# Patient Record
Sex: MALE | Race: WHITE | NOT HISPANIC OR LATINO | Employment: OTHER | URBAN - METROPOLITAN AREA
[De-identification: names, ages, dates, MRNs, and addresses within clinical notes are randomized per-mention and may not be internally consistent; named-entity substitution may affect disease eponyms.]

---

## 2022-02-17 ENCOUNTER — OFFICE VISIT (OUTPATIENT)
Dept: INTERNAL MEDICINE CLINIC | Facility: CLINIC | Age: 57
End: 2022-02-17
Payer: COMMERCIAL

## 2022-02-17 VITALS
TEMPERATURE: 98.9 F | DIASTOLIC BLOOD PRESSURE: 82 MMHG | WEIGHT: 157 LBS | OXYGEN SATURATION: 99 % | HEART RATE: 71 BPM | SYSTOLIC BLOOD PRESSURE: 130 MMHG | BODY MASS INDEX: 25.23 KG/M2 | HEIGHT: 66 IN

## 2022-02-17 DIAGNOSIS — Z12.5 SCREENING FOR MALIGNANT NEOPLASM OF PROSTATE: ICD-10-CM

## 2022-02-17 DIAGNOSIS — R33.9 URINARY RETENTION: ICD-10-CM

## 2022-02-17 DIAGNOSIS — Z13.6 SCREENING FOR CARDIOVASCULAR CONDITION: ICD-10-CM

## 2022-02-17 DIAGNOSIS — Z12.11 SCREENING FOR COLORECTAL CANCER: ICD-10-CM

## 2022-02-17 DIAGNOSIS — M54.16 LUMBAR RADICULOPATHY: ICD-10-CM

## 2022-02-17 DIAGNOSIS — M54.12 CERVICAL RADICULOPATHY: Primary | ICD-10-CM

## 2022-02-17 DIAGNOSIS — E55.9 VITAMIN D DEFICIENCY: ICD-10-CM

## 2022-02-17 DIAGNOSIS — E66.3 OVERWEIGHT: ICD-10-CM

## 2022-02-17 DIAGNOSIS — Z11.59 NEED FOR HEPATITIS C SCREENING TEST: ICD-10-CM

## 2022-02-17 DIAGNOSIS — Z12.12 SCREENING FOR COLORECTAL CANCER: ICD-10-CM

## 2022-02-17 DIAGNOSIS — Z11.4 SCREENING FOR HIV (HUMAN IMMUNODEFICIENCY VIRUS): ICD-10-CM

## 2022-02-17 PROCEDURE — 99204 OFFICE O/P NEW MOD 45 MIN: CPT | Performed by: INTERNAL MEDICINE

## 2022-02-17 PROCEDURE — 1036F TOBACCO NON-USER: CPT | Performed by: INTERNAL MEDICINE

## 2022-02-17 PROCEDURE — 3008F BODY MASS INDEX DOCD: CPT | Performed by: INTERNAL MEDICINE

## 2022-02-17 PROCEDURE — 3725F SCREEN DEPRESSION PERFORMED: CPT | Performed by: INTERNAL MEDICINE

## 2022-02-17 NOTE — PROGRESS NOTES
INTERNAL MEDICINE OFFICE VISIT  [facility] AT [address]  Tel: [phone]      NAME: Ana Sotelo  AGE: 49 y o  SEX: male  : 1965   MRN: [id number]    DATE: 2022  TIME: 10:32 AM      Assessment and Plan:  1  Cervical radiculopathy   was advised to follow-up with pain management  - Ambulatory Referral to Pain Management; Future    2  Lumbar radiculopathy   does not want to take any medication  Was told to follow-up with pain management  - Ambulatory Referral to Pain Management; Future    3  Urinary retention   was told to keep himself well hydrated and see the urologist  - CBC and differential; Future  - Comprehensive metabolic panel; Future  - Ambulatory Referral to Urology; Future    4  Vitamin D deficiency   check a vitamin-D level  - Vitamin D 25 hydroxy; Future    5  Overweight  BMI Counseling: Body mass index is 25 34 kg/m²  The BMI is above normal  Nutrition recommendations include encouraging healthy choices of fruits and vegetables and moderation in carbohydrate intake  Exercise recommendations include moderate physical activity 150 minutes/week  Rationale for BMI follow-up plan is due to patient being overweight or obese  Depression Screening and Follow-up Plan: Patient was screened for depression during today's encounter  They screened negative with a PHQ-2 score of 2         6  Screening for HIV (human immunodeficiency virus)    - HIV 1/2 Antigen/Antibody (4th Generation) w Reflex [facility]; Future    7  Need for hepatitis C screening test    - Hepatitis C Antibody ([facility]); Future    8  Screening for colorectal cancer  - Ambulatory referral to Gastroenterology; Future    9  Screening for cardiovascular condition  - Lipid panel; Future    10  Screening for malignant neoplasm of prostate    - PSA, total and free;  Future      - Counseling Documentation: patient was counseled regarding: diagnostic results, instructions for management, risk factor reductions, prognosis, patient and family education, risks and benefits of treatment options and importance of compliance with treatment  - Medication Side Effects: Adverse side effects of medications were reviewed with the patient/guardian today  Return for follow up visit in  6 months or earlier, if needed  Chief Complaint:  Chief Complaint   Patient presents with    low back pain         History of Present Illness:    this is a new patient was here to establish  He has not seen a doctor for about 10 years  He has been suffering from neck and back issues for a long time  He was worked up for it about 10 years ago but after that he did not go back to the doctors  Now his symptoms are getting worse and he has numbness and pain in his right hand and right leg  He complains of not going to the bathroom for hours to urinate  He does not seem to have any symptoms of a UTI   He needs blood work done and the screening tests      Active Problem List:  Patient Active Problem List   Diagnosis    Vitamin D deficiency    Overweight    Cervical radiculopathy    Lumbar radiculopathy    Urinary retention         Past Medical History:  Past Medical History:   Diagnosis Date    Depression with anxiety 1/30/2015    Fatigue 1/30/2015         Past Surgical History:  History reviewed  No pertinent surgical history  Family History:  History reviewed  No pertinent family history        Social History:  Social History     Socioeconomic History    Marital status: Unknown     Spouse name: None    Number of children: None    Years of education: None    Highest education level: None   Occupational History    None   Tobacco Use    Smoking status: Never Smoker    Smokeless tobacco: Never Used   Vaping Use    Vaping Use: Never used   Substance and Sexual Activity    Alcohol use: None    Drug use: None    Sexual activity: None   Other Topics Concern    None   Social History Narrative    None     Social Determinants of Health     Financial Resource Strain: Not on file   Food Insecurity: Not on file   Transportation Needs: Not on file   Physical Activity: Not on file   Stress: Not on file   Social Connections: Not on file   Intimate Partner Violence: Not on file   Housing Stability: Not on file         Allergies:  No Known Allergies      Medications:  No current outpatient medications on file        The following portions of the patient's history were reviewed and updated as appropriate: past medical history, past surgical history, family history, social history, allergies, current medications and active problem list       Review of Systems:  Constitutional: Denies fever, chills, weight gain, weight loss, fatigue  Eyes: Denies eye redness, eye discharge, double vision, change in visual acuity  ENT: Denies hearing loss, tinnitus, sneezing, nasal congestion, nasal discharge, sore throat   Respiratory: Denies cough, expectoration, hemoptysis, shortness of breath, wheezing  Cardiovascular: Denies chest pain, palpitations, lower extremity swelling, orthopnea, PND  Gastrointestinal: Denies abdominal pain, heartburn, nausea, vomiting, hematemesis, diarrhea, bloody stools  Genito-Urinary: Denies dysuria, frequency, difficulty in micturition, nocturia, incontinence  Musculoskeletal:  Complains of neck pain, right hand and right leg pain, back pain, joint pain, muscle pain  Neurologic: Denies confusion, lightheadedness, syncope, headache, focal weakness, sensory changes, seizures  Endocrine: Denies polyuria, polydipsia, temperature intolerance  Allergy and Immunology: Denies hives, insect bite sensitivity  Hematological and Lymphatic: Denies bleeding problems, swollen glands   Psychological: Denies depression, suicidal ideation, anxiety, panic, mood swings  Dermatological: Denies pruritus, rash, skin lesion changes      Vitals:  Vitals:    02/17/22 1003   BP: 130/82   Pulse: 71   Temp: 98 9 °F (37 2 °C)   SpO2: 99%       Body mass index is 25 34 kg/m²  Weight (last 2 days)     Date/Time Weight    02/17/22 1003 71 2 (157)            Physical Examination:  General: Patient is not in acute distress  Awake, alert, responding to commands  No weight gain or loss  Head: Normocephalic  Atraumatic  Eyes: Conjunctiva and lids with no swelling, erythema or discharge  Both pupils normal sized, round and reactive to light  Sclera nonicteric  ENT: External examination of nose and ear normal  Otoscopic examination shows translucent tympanic membranes with patent canals without erythema  Oropharynx moist with no erythema, edema, exudate or lesions  Neck: Supple  JVP not raised  Trachea midline  No masses  No thyromegaly  Lungs: No signs of increased work of breathing or respiratory distress  Bilateral bronchovascular breath sounds with no crackles or rhonchi  Chest wall: No tenderness  Cardiovascular: Normal PMI  No thrills  Regular rate and rhythm  S1 and S2 normal  No murmur, rub or gallop  Gastrointestinal: Abdomen soft, nontender  No guarding or rigidity  Liver and spleen not palpable  Bowel sounds present  Neurologic: Cranial nerves II-XII intact   Cortical functions normal  Motor system - Reflexes 2+ and symmetrical  Sensations normal  Musculoskeletal: Gait normal  No joint tenderness  Integumentary: Skin normal with no rash or lesions  Lymphatic: No palpable lymph nodes in neck, axilla or groin  Extremities: No clubbing, cyanosis, edema or varicosities  Psychological: Judgement and insight normal  Mood and affect normal      Laboratory Results:  CBC with diff:   Lab Results   Component Value Date    WBC 7 6 01/30/2015    RBC 4 54 01/30/2015    HGB 15 1 01/30/2015    HCT 44 4 01/30/2015    MCV 98 01/30/2015    MCH 33 2 01/30/2015    RDW 11 5 01/30/2015     01/30/2015       CMP:  Lab Results   Component Value Date    CREATININE 0 9 01/30/2015    BUN 12 01/30/2015     01/30/2015    K 4 1 01/30/2015     01/30/2015    CO2 30 8 01/30/2015    GLUCOSE 145 (H) 01/30/2015    PROT 7 1 01/30/2015    ALKPHOS 68 01/30/2015    ALT 23 01/30/2015    AST 18 01/30/2015       No results found for: HGBA1C, MG, PHOS    No results found for: TROPONINI, CKMB, CKTOTAL    Lipid Profile:   No results found for: CHOL  No results found for: HDL  No results found for: LDLCALC  No results found for: TRIG    Imaging Results:  No image results found         Health Maintenance:  Health Maintenance   Topic Date Due    Hepatitis C Screening  Never done    HIV Screening  Never done    BMI: Followup Plan  Never done    Annual Physical  Never done    DTaP,Tdap,and Td Vaccines (1 - Tdap) Never done    Colorectal Cancer Screening  Never done    Influenza Vaccine (1) Never done    COVID-19 Vaccine (3 - Booster for Moderna series) 10/27/2021    BMI: Adult  02/17/2023    Pneumococcal Vaccine: Pediatrics (0 to 5 Years) and At-Risk Patients (6 to 59 Years)  Aged Out    HIB Vaccine  Aged Out    Hepatitis B Vaccine  Aged Out    IPV Vaccine  Aged Out    Hepatitis A Vaccine  Aged Out    Meningococcal ACWY Vaccine  Aged Out    HPV Vaccine  Aged [employer] History   Administered Date(s) Administered    COVID-19 MODERNA VACC 0 5 ML IM 04/29/2021, 05/27/2021         Levy Greer MD  2/17/2022,10:32 AM

## 2022-02-22 ENCOUNTER — APPOINTMENT (OUTPATIENT)
Dept: LAB | Facility: CLINIC | Age: 57
End: 2022-02-22
Payer: COMMERCIAL

## 2022-02-22 ENCOUNTER — TELEPHONE (OUTPATIENT)
Dept: INTERNAL MEDICINE CLINIC | Facility: CLINIC | Age: 57
End: 2022-02-22

## 2022-02-22 ENCOUNTER — TELEPHONE (OUTPATIENT)
Dept: GASTROENTEROLOGY | Facility: CLINIC | Age: 57
End: 2022-02-22

## 2022-02-22 DIAGNOSIS — E55.9 VITAMIN D DEFICIENCY: Primary | ICD-10-CM

## 2022-02-22 DIAGNOSIS — Z12.5 SCREENING FOR MALIGNANT NEOPLASM OF PROSTATE: ICD-10-CM

## 2022-02-22 DIAGNOSIS — Z13.6 SCREENING FOR CARDIOVASCULAR CONDITION: ICD-10-CM

## 2022-02-22 DIAGNOSIS — E55.9 VITAMIN D DEFICIENCY: ICD-10-CM

## 2022-02-22 DIAGNOSIS — R33.9 URINARY RETENTION: ICD-10-CM

## 2022-02-22 DIAGNOSIS — Z11.59 NEED FOR HEPATITIS C SCREENING TEST: ICD-10-CM

## 2022-02-22 DIAGNOSIS — Z11.4 SCREENING FOR HIV (HUMAN IMMUNODEFICIENCY VIRUS): ICD-10-CM

## 2022-02-22 LAB
25(OH)D3 SERPL-MCNC: 12.4 NG/ML (ref 30–100)
ALBUMIN SERPL BCP-MCNC: 4 G/DL (ref 3.5–5)
ALP SERPL-CCNC: 55 U/L (ref 46–116)
ALT SERPL W P-5'-P-CCNC: 27 U/L (ref 12–78)
ANION GAP SERPL CALCULATED.3IONS-SCNC: 3 MMOL/L (ref 4–13)
AST SERPL W P-5'-P-CCNC: 26 U/L (ref 5–45)
BASOPHILS # BLD AUTO: 0.05 THOUSANDS/ΜL (ref 0–0.1)
BASOPHILS NFR BLD AUTO: 1 % (ref 0–1)
BILIRUB SERPL-MCNC: 0.3 MG/DL (ref 0.2–1)
BUN SERPL-MCNC: 19 MG/DL (ref 5–25)
CALCIUM SERPL-MCNC: 9.3 MG/DL (ref 8.3–10.1)
CHLORIDE SERPL-SCNC: 110 MMOL/L (ref 100–108)
CHOLEST SERPL-MCNC: 171 MG/DL
CO2 SERPL-SCNC: 27 MMOL/L (ref 21–32)
CREAT SERPL-MCNC: 1.07 MG/DL (ref 0.6–1.3)
EOSINOPHIL # BLD AUTO: 0.37 THOUSAND/ΜL (ref 0–0.61)
EOSINOPHIL NFR BLD AUTO: 8 % (ref 0–6)
ERYTHROCYTE [DISTWIDTH] IN BLOOD BY AUTOMATED COUNT: 12.2 % (ref 11.6–15.1)
GFR SERPL CREATININE-BSD FRML MDRD: 78 ML/MIN/1.73SQ M
GLUCOSE P FAST SERPL-MCNC: 103 MG/DL (ref 65–99)
HCT VFR BLD AUTO: 42 % (ref 36.5–49.3)
HCV AB SER QL: NORMAL
HDLC SERPL-MCNC: 55 MG/DL
HGB BLD-MCNC: 14.3 G/DL (ref 12–17)
IMM GRANULOCYTES # BLD AUTO: 0.01 THOUSAND/UL (ref 0–0.2)
IMM GRANULOCYTES NFR BLD AUTO: 0 % (ref 0–2)
LDLC SERPL CALC-MCNC: 102 MG/DL (ref 0–100)
LYMPHOCYTES # BLD AUTO: 0.76 THOUSANDS/ΜL (ref 0.6–4.47)
LYMPHOCYTES NFR BLD AUTO: 16 % (ref 14–44)
MCH RBC QN AUTO: 32.4 PG (ref 26.8–34.3)
MCHC RBC AUTO-ENTMCNC: 34 G/DL (ref 31.4–37.4)
MCV RBC AUTO: 95 FL (ref 82–98)
MONOCYTES # BLD AUTO: 0.46 THOUSAND/ΜL (ref 0.17–1.22)
MONOCYTES NFR BLD AUTO: 10 % (ref 4–12)
NEUTROPHILS # BLD AUTO: 3.02 THOUSANDS/ΜL (ref 1.85–7.62)
NEUTS SEG NFR BLD AUTO: 65 % (ref 43–75)
NONHDLC SERPL-MCNC: 116 MG/DL
NRBC BLD AUTO-RTO: 0 /100 WBCS
PLATELET # BLD AUTO: 199 THOUSANDS/UL (ref 149–390)
PMV BLD AUTO: 11.3 FL (ref 8.9–12.7)
POTASSIUM SERPL-SCNC: 4.4 MMOL/L (ref 3.5–5.3)
PROT SERPL-MCNC: 7.1 G/DL (ref 6.4–8.2)
RBC # BLD AUTO: 4.41 MILLION/UL (ref 3.88–5.62)
SODIUM SERPL-SCNC: 140 MMOL/L (ref 136–145)
TRIGL SERPL-MCNC: 70 MG/DL
WBC # BLD AUTO: 4.67 THOUSAND/UL (ref 4.31–10.16)

## 2022-02-22 PROCEDURE — 82306 VITAMIN D 25 HYDROXY: CPT

## 2022-02-22 PROCEDURE — 84153 ASSAY OF PSA TOTAL: CPT

## 2022-02-22 PROCEDURE — 80061 LIPID PANEL: CPT

## 2022-02-22 PROCEDURE — 84154 ASSAY OF PSA FREE: CPT

## 2022-02-22 PROCEDURE — 36415 COLL VENOUS BLD VENIPUNCTURE: CPT

## 2022-02-22 PROCEDURE — 87389 HIV-1 AG W/HIV-1&-2 AB AG IA: CPT

## 2022-02-22 PROCEDURE — 86803 HEPATITIS C AB TEST: CPT

## 2022-02-22 PROCEDURE — 85025 COMPLETE CBC W/AUTO DIFF WBC: CPT

## 2022-02-22 PROCEDURE — 80053 COMPREHEN METABOLIC PANEL: CPT

## 2022-02-22 RX ORDER — ERGOCALCIFEROL 1.25 MG/1
50000 CAPSULE ORAL WEEKLY
Qty: 12 CAPSULE | Refills: 3 | Status: SHIPPED | OUTPATIENT
Start: 2022-02-22

## 2022-02-22 NOTE — TELEPHONE ENCOUNTER
Called the patient and notified him   Patient wants to know if there is alternative option instead of taking the supplement to bring up his vit D levels, please advise

## 2022-02-22 NOTE — TELEPHONE ENCOUNTER
----- Message from Nury Guillory MD sent at 2/22/2022  4:39 PM EST -----   Vitamin-D is very low, please  the vitamin-D from the pharmacy and take it every week

## 2022-02-23 LAB
HIV 1+2 AB+HIV1 P24 AG SERPL QL IA: NORMAL
PSA FREE MFR SERPL: 50 %
PSA FREE SERPL-MCNC: 0.1 NG/ML
PSA SERPL-MCNC: 0.2 NG/ML (ref 0–4)

## 2022-03-04 ENCOUNTER — TELEPHONE (OUTPATIENT)
Dept: UROLOGY | Facility: AMBULATORY SURGERY CENTER | Age: 57
End: 2022-03-04

## 2022-03-04 NOTE — TELEPHONE ENCOUNTER
Complaint/Diagnosis:Retention         Insurance:[employer]    History of cancer:no    Previous urologist:no    OutsideTesting/where:[facility]    If yes,what kind:ep[facility]    Records requested/where:epic    Preferred location:[place]

## 2022-03-08 NOTE — TELEPHONE ENCOUNTER
Attempted to contact patient  No answer  Left voice message to contact our practice to schedule an appt

## 2022-03-10 NOTE — PROGRESS NOTES
Assessment:  1  Chronic pain syndrome    2  Neck pain    3  Cervical radiculopathy    4  Chronic right-sided low back pain with right-sided sciatica    5  Lumbar radiculopathy        Plan:  Orders Placed This Encounter   Procedures    MRI cervical spine without contrast     Standing Status:   Future     Standing Expiration Date:   3/15/2026     Scheduling Instructions: There is no preparation for this test  Please leave your jewelry and valuables at home, wedding rings are the exception  Magnetic nail polish must be removed prior to arrival for your test  Please bring your insurance cards, a form of photo ID and a list of your medications with you  Arrive 15 minutes prior to your appointment time in order to register  Please bring any prior CT or MRI studies of this area that were not performed at a [facility]  To schedule this appointment, please contact Central Scheduling at [phone]  Prior to your appointment, please make sure you complete the MRI Screening Form when you e-Check in for your appointment  This will be available starting 7 days before your appointment in [address]  You may receive an e-mail with an activation code if you do not have a [employer] account  If you do not have access to a device, we will complete your screening at your appointment  Order Specific Question:   What is the patient's sedation requirement? Answer:   Unknown     Order Specific Question:   Release to patient through [employer]     Answer:   Immediate     Order Specific Question:   Is order priority selected as STAT? Answer:   No     Order Specific Question:   Reason for Exam (FREE TEXT)     Answer:   cervical radiculopathy    MRI lumbar spine without contrast     Standing Status:   Future     Standing Expiration Date:   3/15/2026     Scheduling Instructions:       There is no preparation for this test  Please leave your jewelry and valuables at home, wedding rings are the exception  Magnetic nail polish must be removed prior to arrival for your test  Please bring your insurance cards, a form of photo ID and a list of your medications with you  Arrive 15 minutes prior to your appointment time in order to register  Please bring any prior CT or MRI studies of this area that were not performed at a [facility]  To schedule this appointment, please contact Central Scheduling at [phone]  Prior to your appointment, please make sure you complete the MRI Screening Form when you e-Check in for your appointment  This will be available starting 7 days before your appointment in [address]  You may receive an e-mail with an activation code if you do not have a [employer] account  If you do not have access to a device, we will complete your screening at your appointment  Order Specific Question:   What is the patient's sedation requirement? Answer:   Unknown     Order Specific Question:   Release to patient through [employer]     Answer:   Immediate     Order Specific Question:   Is order priority selected as STAT? Answer:   No     Order Specific Question:   Reason for Exam (FREE TEXT)     Answer:   lumbar radiculopathy     My impressions and treatment recommendations were discussed in detail with the patient, who verbalized understanding and had no further questions  The patient is reporting pain primarily in his neck with radiation into the right upper extremity as well as his low back with radiation into the right lower extremity  He states that his symptoms are not helped with physical therapy  He has done physical therapy in the past and has been doing the physical therapy directed exercises for the past several weeks  He reports that neither his neck pain or low back pain is helped with the physical therapy directed exercises    As such, I feel reasonable to have the patient undergo a MRI of the cervical spine as well as the MRI of the lumbar spine to evaluate for any pathology that may be contributing to his pain complaints  Follow-up is planned in 4 weeks time or sooner as warranted  Discharge instructions were provided  I personally saw and examined the patient and I agree with the above discussed plan of care  History of Present Illness:    Marycarmen Alcantar is a 49 y o  male who presents to [facility] for initial evaluation of the above stated pain complaints  The patient has a past medical and chronic pain history as outlined in the assessment section  He was referred by Dr Fab Agustin  The patient is reporting pain primarily in the neck with radiation into the right upper extremity as well as low back with radiation into the right lower extremity to about the level of the right knee in what appears to be the right S1 distribution  He describes his pain as moderate to severe and 7 to 9/10 on the verbal numerical pain rating scale  He states that his pain is nearly constant in nature without any typical pattern  He describes his pain as shooting, numbness, sharp, dull/aching  He reports weakness in his upper extremities  He does not ambulate with any assistive devices  Lying down, standing, bending, sitting, walking, exercise, coughing, and sneezing increases pain  There are no pain relieving factors  Physical therapy and home exercises along with heat/ice treatment provided moderate pain relief  The patient does drink alcohol 1-2 times per week  Review of Systems:    Review of Systems   Constitutional: Negative for fever and unexpected weight change  HENT: Negative for trouble swallowing  Eyes: Negative for visual disturbance  Respiratory: Negative for shortness of breath and wheezing  Cardiovascular: Negative for chest pain and palpitations  Gastrointestinal: Negative for constipation, diarrhea, nausea and vomiting     Endocrine: Negative for cold intolerance, heat intolerance and polydipsia  Genitourinary: Negative for difficulty urinating and frequency  Musculoskeletal: Positive for arthralgias  Negative for gait problem, joint swelling and myalgias  Skin: Negative for rash  Neurological: Positive for numbness and headaches  Negative for dizziness, seizures, syncope and weakness  Hematological: Does not bruise/bleed easily  Psychiatric/Behavioral: Negative for dysphoric mood  All other systems reviewed and are negative  Patient Active Problem List   Diagnosis    Vitamin D deficiency    Overweight    Cervical radiculopathy    Lumbar radiculopathy    Urinary retention       Past Medical History:   Diagnosis Date    Depression with anxiety 1/30/2015    Fatigue 1/30/2015       History reviewed  No pertinent surgical history  Family History   Problem Relation Age of Onset    No Known Problems Mother     Diabetes Father     Heart disease Father     Diabetes Sister     Heart disease Sister     Diabetes Brother     Heart disease Brother        Social History     Occupational History    Not on file   Tobacco Use    Smoking status: Never Smoker    Smokeless tobacco: Never Used   Vaping Use    Vaping Use: Never used   Substance and Sexual Activity    Alcohol use: Yes     Comment: occasionally    Drug use: Not Currently    Sexual activity: Not on file         Current Outpatient Medications:     ergocalciferol (VITAMIN D2) 50,000 units, Take 1 capsule (50,000 Units total) by mouth once a week, Disp: 12 capsule, Rfl: 3    No Known Allergies    Physical Exam:    /93   Pulse 60   Resp 18   Ht 5' 6" (1 676 m)   Wt 71 7 kg (158 lb)   BMI 25 50 kg/m²     Constitutional: normal, well developed, well nourished, alert, in no distress and non-toxic and no overt pain behavior    Eyes: anicteric  HEENT: grossly intact  Neck: supple, symmetric, trachea midline and no masses   Pulmonary:even and unlabored  Cardiovascular:No edema or pitting edema present  Skin:Normal without rashes or lesions and well hydrated  Psychiatric:Mood and affect appropriate  Neurologic:Cranial Nerves II-XII grossly intact  Musculoskeletal:normal     Cervical Spine Exam    Appearance:  Normal lordosis  Palpation/Tenderness:  no tenderness or spasm  Sensory:  no sensory deficits noted  Range of Motion:  Flexion: Moderately limited  with pain  Extension:  Moderately limited  with pain  Lateral Flexion - Left:  Moderately limited  with pain  Lateral Flexion - Right:  Moderately limited  with pain  Rotation - Left:  Moderately limited  with pain  Rotation - Right:  Moderately limited  with pain  Motor Strength:  Left Arm Flexion  5/5  Left Arm Extension  5/5  Right Arm Flexion  5/5  Right Arm Extension  5/5  Left Wrist Flexion  5/5  Left Wrist Extension  5/5  Left Finger Abduction  5/5  Right Finger Abduction  5/5  Left Pincer Grasp  5/5  Right Pincer Grasp  5/5  Left    5/5  Right   5/5  Reflexes:  Left Biceps:  2+   Right Biceps:  2+   Left Brachioradialis:  2+   Right Brachioradialis:  2+   Left Triceps:  2+   Right Triceps:  2+   Special Tests:  Left Spurlings:  negative  Right Spurlings  negative     Lumbar Spine Exam    Appearance:  Normal lordosis  Palpation/Tenderness:  no tenderness or spasm  Sensory:  no sensory deficits noted  Range of Motion:  Flexion:   Moderately limited  with pain  Extension:  Moderately limited  with pain  Lateral Flexion - Left:  Moderately limited  with pain  Lateral Flexion - Right:  Moderately limited  with pain  Rotation - Left:  Moderately limited  with pain  Rotation - Right:  Moderately limited  with pain  Motor Strength:  Left hip flexion:  5/5  Left hip extension:  5/5  Right hip flexion:  5/5  Right hip extension:  5/5  Left knee flexion:  5/5  Left knee extension:  5/5  Right knee flexion:  5/5  Right knee extension:  5/5  Left foot dorsiflexion:  5/5  Left foot plantar flexion:  5/5  Right foot dorsiflexion:  5/5  Right foot plantar flexion:  5/5  Reflexes:  Left Patellar:  2+   Right Patellar:  2+   Left Achilles:  2+   Right Achilles:  2+   Special Tests:  Left Straight Leg Test:  negative  Right Straight Leg Test:  negative  Left Chandler's Maneuver:  negative  Right Chandler's Maneuver:  negative        Imaging  MRI cervical spine without contrast    (Results Pending)   MRI lumbar spine without contrast    (Results Pending)       Orders Placed This Encounter   Procedures    MRI cervical spine without contrast    MRI lumbar spine without contrast

## 2022-03-10 NOTE — TELEPHONE ENCOUNTER
3rd attempt to contact patient   No answer left a message on voice mail for patient to contact our office to schedule an appt

## 2022-03-15 ENCOUNTER — CONSULT (OUTPATIENT)
Dept: PAIN MEDICINE | Facility: CLINIC | Age: 57
End: 2022-03-15
Payer: COMMERCIAL

## 2022-03-15 VITALS
HEIGHT: 66 IN | DIASTOLIC BLOOD PRESSURE: 93 MMHG | WEIGHT: 158 LBS | BODY MASS INDEX: 25.39 KG/M2 | SYSTOLIC BLOOD PRESSURE: 142 MMHG | RESPIRATION RATE: 18 BRPM | HEART RATE: 60 BPM

## 2022-03-15 DIAGNOSIS — M54.16 LUMBAR RADICULOPATHY: ICD-10-CM

## 2022-03-15 DIAGNOSIS — M54.2 NECK PAIN: ICD-10-CM

## 2022-03-15 DIAGNOSIS — M54.12 CERVICAL RADICULOPATHY: ICD-10-CM

## 2022-03-15 DIAGNOSIS — M54.41 CHRONIC RIGHT-SIDED LOW BACK PAIN WITH RIGHT-SIDED SCIATICA: ICD-10-CM

## 2022-03-15 DIAGNOSIS — G89.4 CHRONIC PAIN SYNDROME: Primary | ICD-10-CM

## 2022-03-15 DIAGNOSIS — G89.29 CHRONIC RIGHT-SIDED LOW BACK PAIN WITH RIGHT-SIDED SCIATICA: ICD-10-CM

## 2022-03-15 PROCEDURE — 99204 OFFICE O/P NEW MOD 45 MIN: CPT | Performed by: ANESTHESIOLOGY

## 2022-03-15 NOTE — PATIENT INSTRUCTIONS
Magnetic Resonance Imaging   WHAT YOU NEED TO KNOW:   What do I need to know about magnetic resonance imaging (MRI)? An MRI is a test that uses magnetic fields and radio waves to take pictures inside your body  An MRI is used to see blood vessels, tissue, muscles, and bones  It can also show organs, such as your heart, lungs, or liver  An MRI can help your healthcare provider diagnose or treat a medical condition  It does not use radiation  How do I prepare for an MRI? · Your healthcare provider will tell you which medicines to take or not take on the day of your MRI  He or she may give you medicine to help you feel calm and relaxed during the MRI  · Tell your provider if you know or think you are pregnant  · Tell your provider if you have any metal in your body, such as a pacemaker, implant, or aneurism clip  Tell him or her if you have a tattoo or wear a medicine patch  · Remove any metal items, such as hair clips, jewelry, glasses, hearing aids, or dentures before you enter the MRI room  What will happen during an MRI? Your healthcare provider will ask you to lie on a table  Contrast liquid may be used to help a body part show up more clearly  The table will be moved into an open space in the middle of the machine  You will need to lie still during the MRI  It is normal to hear knocking, thumping, or clicking noises from the machine  What are the risks of an MRI? An MRI may cause a metal object in your body to move out of place and cause serious injury, or stop working properly  The contrast liquid may cause nausea, a headache, lightheadedness, or pain at the injection site  You could have an allergic reaction to the contrast liquid  CARE AGREEMENT:   You have the right to help plan your care  Learn about your health condition and how it may be treated  Discuss treatment options with your healthcare providers to decide what care you want to receive   You always have the right to refuse treatment  The above information is an  only  It is not intended as medical advice for individual conditions or treatments  Talk to your doctor, nurse or pharmacist before following any medical regimen to see if it is safe and effective for you  © Copyright [employer] 2022 Information is for End User's use only and may not be sold, redistributed or otherwise used for commercial purposes   All illustrations and images included in CareNotes® are the copyrighted property of A [employer] M , [employer]  or [address] [place] [address]

## 2022-03-21 ENCOUNTER — ANESTHESIA EVENT (OUTPATIENT)
Dept: GASTROENTEROLOGY | Facility: HOSPITAL | Age: 57
End: 2022-03-21

## 2022-03-21 ENCOUNTER — HOSPITAL ENCOUNTER (OUTPATIENT)
Dept: GASTROENTEROLOGY | Facility: HOSPITAL | Age: 57
Setting detail: OUTPATIENT SURGERY
Discharge: HOME | End: 2022-03-21
Attending: INTERNAL MEDICINE
Payer: COMMERCIAL

## 2022-03-21 ENCOUNTER — ANESTHESIA (OUTPATIENT)
Dept: GASTROENTEROLOGY | Facility: HOSPITAL | Age: 57
End: 2022-03-21

## 2022-03-21 VITALS
RESPIRATION RATE: 18 BRPM | HEART RATE: 62 BPM | DIASTOLIC BLOOD PRESSURE: 73 MMHG | SYSTOLIC BLOOD PRESSURE: 114 MMHG | TEMPERATURE: 98.3 F | OXYGEN SATURATION: 98 % | HEIGHT: 66 IN | BODY MASS INDEX: 24.77 KG/M2 | WEIGHT: 154.1 LBS

## 2022-03-21 DIAGNOSIS — Z12.11 SCREEN FOR COLON CANCER: ICD-10-CM

## 2022-03-21 PROCEDURE — G0121 COLON CA SCRN NOT HI RSK IND: HCPCS | Performed by: INTERNAL MEDICINE

## 2022-03-21 RX ORDER — SODIUM CHLORIDE, SODIUM LACTATE, POTASSIUM CHLORIDE, CALCIUM CHLORIDE 600; 310; 30; 20 MG/100ML; MG/100ML; MG/100ML; MG/100ML
INJECTION, SOLUTION INTRAVENOUS CONTINUOUS PRN
Status: DISCONTINUED | OUTPATIENT
Start: 2022-03-21 | End: 2022-03-21

## 2022-03-21 RX ORDER — SODIUM CHLORIDE, SODIUM LACTATE, POTASSIUM CHLORIDE, CALCIUM CHLORIDE 600; 310; 30; 20 MG/100ML; MG/100ML; MG/100ML; MG/100ML
125 INJECTION, SOLUTION INTRAVENOUS CONTINUOUS
Status: DISCONTINUED | OUTPATIENT
Start: 2022-03-21 | End: 2022-03-25 | Stop reason: HOSPADM

## 2022-03-21 RX ORDER — LIDOCAINE HYDROCHLORIDE 20 MG/ML
INJECTION, SOLUTION EPIDURAL; INFILTRATION; INTRACAUDAL; PERINEURAL AS NEEDED
Status: DISCONTINUED | OUTPATIENT
Start: 2022-03-21 | End: 2022-03-21

## 2022-03-21 RX ORDER — PROPOFOL 10 MG/ML
INJECTION, EMULSION INTRAVENOUS AS NEEDED
Status: DISCONTINUED | OUTPATIENT
Start: 2022-03-21 | End: 2022-03-21

## 2022-03-21 RX ADMIN — PROPOFOL 120 MG: 10 INJECTION, EMULSION INTRAVENOUS at 08:34

## 2022-03-21 RX ADMIN — SODIUM CHLORIDE, SODIUM LACTATE, POTASSIUM CHLORIDE, AND CALCIUM CHLORIDE 125 ML/HR: .6; .31; .03; .02 INJECTION, SOLUTION INTRAVENOUS at 07:36

## 2022-03-21 RX ADMIN — LIDOCAINE HYDROCHLORIDE 70 MG: 20 INJECTION, SOLUTION EPIDURAL; INFILTRATION; INTRACAUDAL; PERINEURAL at 08:34

## 2022-03-21 RX ADMIN — PROPOFOL 30 MG: 10 INJECTION, EMULSION INTRAVENOUS at 08:37

## 2022-03-21 RX ADMIN — SODIUM CHLORIDE, SODIUM LACTATE, POTASSIUM CHLORIDE, AND CALCIUM CHLORIDE: .6; .31; .03; .02 INJECTION, SOLUTION INTRAVENOUS at 08:30

## 2022-03-21 NOTE — H&P
History and Physical -  [facility]  Rafael Wood 49 y o  male MRN: [id number]                  HPI: Rafael Wood is a 49y o  year old male who presents for colonoscopy for initial average risk screening  No prior colonoscopy  Asymptomatic  No family history      REVIEW OF SYSTEMS: Per the HPI, and otherwise unremarkable  Historical Information   Past Medical History:   Diagnosis Date    Depression with anxiety 1/30/2015    Fatigue 1/30/2015     History reviewed  No pertinent surgical history  Social History   Social History     Substance and Sexual Activity   Alcohol Use Yes    Comment: occasionally     Social History     Substance and Sexual Activity   Drug Use Not Currently     Social History     Tobacco Use   Smoking Status Never Smoker   Smokeless Tobacco Never Used     Family History   Problem Relation Age of Onset    No Known Problems Mother     Diabetes Father     Heart disease Father     Diabetes Sister     Heart disease Sister     Diabetes Brother     Heart disease Brother        Meds/Allergies     (Not in a hospital admission)      No Known Allergies    Objective     Blood pressure 155/82, pulse 55, temperature 97 6 °F (36 4 °C), temperature source Temporal, resp  rate 16, height 5' 6" (1 676 m), weight 69 9 kg (154 lb 1 6 oz), SpO2 100 %        PHYSICAL EXAM    Gen: NAD  CV: RRR  CHEST: Clear  ABD: soft, NT/ND  EXT: no edema  Neuro: AAO      ASSESSMENT/PLAN:  This is a 49y o  year old male here for initial average risk screen    PLAN:   Procedure:  Colonoscopy

## 2022-03-21 NOTE — ANESTHESIA PREPROCEDURE EVALUATION
Procedure:  COLONOSCOPY    Relevant Problems   Other   (+) Vitamin D deficiency        Physical Exam    Airway    Mallampati score: II  TM Distance: >3 FB  Neck ROM: full     Dental   Comment: Denies loose teeth,     Cardiovascular  Cardiovascular exam normal    Pulmonary  Pulmonary exam normal     Other Findings  Portions of exam deferred due to low yield and/or unknown COVID status      Anesthesia Plan  ASA Score- 1     Anesthesia Type- IV sedation with anesthesia with ASA Monitors  Additional Monitors:   Airway Plan:           Plan Factors-Exercise tolerance (METS): >4 METS  Chart reviewed  Existing labs reviewed  Patient summary reviewed  Patient is not a current smoker  Induction- intravenous  Postoperative Plan-     Informed Consent- Anesthetic plan and risks discussed with patient  I personally reviewed this patient with the CRNA  Discussed and agreed on the Anesthesia Plan with the CRNA  Marysol Ferrer

## 2022-03-21 NOTE — ANESTHESIA POSTPROCEDURE EVALUATION
Post-Op Assessment Note    CV Status:  Stable  Pain Score: 0    Pain management: adequate     Mental Status:  Alert and awake   Hydration Status:  Euvolemic   PONV Controlled:  Controlled   Airway Patency:  Patent      Post Op Vitals Reviewed: Yes      Staff: CRNA         No complications documented      BP 99/71 (03/21/22 0845)    Temp 98 3 °F (36 8 °C) (03/21/22 0845)    Pulse 68 (03/21/22 0845)   Resp 18 (03/21/22 0845)    SpO2 98 % (03/21/22 0845)

## 2022-03-24 NOTE — PROGRESS NOTES
3/25/2022      Chief Complaint   Patient presents with    Urinary Retention     Assessment and Plan    49 y o  male new patient     1  Difficulty urinating  - Patient c/o decreased frequency of urination  Potentially related to spinal issues  - Bladder scan=33 mL  - Recommend proper hydration, timed voiding, and avoiding constipation  - DAVID unremarkable  PSA from 2/22/22 was 0 2    2  ED   - Likely related to recent spinal issues  - Rx for Sildenafil provided  - Can f/u as needed for any ongoing urologic issues  Recommend continuing yearly prostate cancer screening with his PCP  History of Present Illness  Dirk Melendez is a 49 y o  male here for new patient evaluation with chief complaint of urinary retention  He complains of not going to the bathroom for hours to urinate  He occasionally will have urinary urgency  Occasionally will also have slightly weak urinary stream   Denies any other urinary complaints  Denies any significant bother from the symptoms  He denies any recurrent UTIs  Denies any prior  surgical manipulation  He is currently dealing with several spinal issues including cervical and lumbar radiculopathy and will be seeing spine specialist and neurologist     He also admits to erectile dysfunction issues and issues with maintaining, obtaining erections as well as difficulty achieving orgasm  Denies any prior treatment for this  Denies any pain with erections or penile curvature  Denies any history of heart attack or stroke  PSA from 2/22/22 was 0 2  He has normal kidney function  Denies any family history of  malignancy  Unable to provide UA  Bladder scan=33 mL     AUA SYMPTOM SCORE      Most Recent Value   AUA SYMPTOM SCORE    How often have you had a sensation of not emptying your bladder completely after you finished urinating? 2   How often have you had to urinate again less than two hours after you finished urinating?  2   How often have you found you stopped and started again several times when you urinate? 3   How often have you found it difficult to postpone urination? 1   How often have you had a weak urinary stream? 4   How often have you had to push or strain to begin urination? 1   How many times did you most typically get up to urinate from the time you went to bed at night until the time you got up in the morning? 1   Quality of Life: If you were to spend the rest of your life with your urinary condition just the way it is now, how would you feel about that? 3   AUA SYMPTOM SCORE 14          Review of Systems   Constitutional: Negative for chills and fever  Respiratory: Negative for shortness of breath  Cardiovascular: Negative for chest pain  Gastrointestinal: Negative for abdominal pain and constipation  Genitourinary: Positive for decreased urine volume  Negative for difficulty urinating, dysuria, flank pain, frequency, hematuria, penile pain, testicular pain and urgency  Neurological: Negative for dizziness  Past Medical History  Past Medical History:   Diagnosis Date    Depression with anxiety 1/30/2015    Fatigue 1/30/2015       Past Social History  History reviewed  No pertinent surgical history    Social History     Tobacco Use   Smoking Status Never Smoker   Smokeless Tobacco Never Used       Past Family History  Family History   Problem Relation Age of Onset    No Known Problems Mother     Diabetes Father     Heart disease Father     Diabetes Sister     Heart disease Sister     Diabetes Brother     Heart disease Brother        Past Social history  Social History     Socioeconomic History    Marital status: /Civil Union     Spouse name: Not on file    Number of children: Not on file    Years of education: Not on file    Highest education level: Not on file   Occupational History    Not on file   Tobacco Use    Smoking status: Never Smoker    Smokeless tobacco: Never Used   Vaping Use    Vaping Use: Never used Substance and Sexual Activity    Alcohol use: Yes     Comment: occasionally    Drug use: Not Currently    Sexual activity: Not on file   Other Topics Concern    Not on file   Social History Narrative    Not on file     Social Determinants of Health     Financial Resource Strain: Not on file   Food Insecurity: Not on file   Transportation Needs: Not on file   Physical Activity: Not on file   Stress: Not on file   Social Connections: Not on file   Intimate Partner Violence: Not on file   Housing Stability: Not on file       Current Medications  Current Outpatient Medications   Medication Sig Dispense Refill    ergocalciferol (VITAMIN D2) 50,000 units Take 1 capsule (50,000 Units total) by mouth once a week 12 capsule 3     No current facility-administered medications for this visit  Allergies  No Known Allergies      The following portions of the patient's history were reviewed and updated as appropriate: allergies, current medications, past medical history, past social history, past surgical history and problem list       Vitals  Vitals:    03/25/22 0931   BP: 130/84   Pulse: 59   SpO2: 99%   Weight: 72 1 kg (159 lb)   Height: 5' 6" (1 676 m)           Physical Exam  Physical Exam  Constitutional:       Appearance: Normal appearance  HENT:      Head: Normocephalic and atraumatic  Right Ear: External ear normal       Left Ear: External ear normal    Eyes:      General: No scleral icterus  Conjunctiva/sclera: Conjunctivae normal    Cardiovascular:      Pulses: Normal pulses  Pulmonary:      Effort: Pulmonary effort is normal    Genitourinary:     Comments: Prostate approximately 30 g without nodules or tenderness  Musculoskeletal:         General: Normal range of motion  Cervical back: Normal range of motion  Skin:     General: Skin is warm and dry  Neurological:      General: No focal deficit present  Mental Status: He is alert and oriented to person, place, and time  Psychiatric:         Mood and Affect: Mood normal          Behavior: Behavior normal          Thought Content:  Thought content normal          Judgment: Judgment normal            Results  Recent Results (from the past 1 hour(s))   POCT Measure PVR    Collection Time: 03/25/22  9:40 AM   Result Value Ref Range    POST-VOID RESIDUAL VOLUME, ML POC 33 mL   ]  Lab Results   Component Value Date    PSA 0 2 02/22/2022     Lab Results   Component Value Date    GLUCOSE 145 (H) 01/30/2015    CALCIUM 9 3 02/22/2022     01/30/2015    K 4 4 02/22/2022    CO2 27 02/22/2022     (H) 02/22/2022    BUN 19 02/22/2022    CREATININE 1 07 02/22/2022     Lab Results   Component Value Date    WBC 4 67 02/22/2022    HGB 14 3 02/22/2022    HCT 42 0 02/22/2022    MCV 95 02/22/2022     02/22/2022           Orders  Orders Placed This Encounter   Procedures    POCT Measure PVR     Inge Almazan PA-C

## 2022-03-25 ENCOUNTER — OFFICE VISIT (OUTPATIENT)
Dept: UROLOGY | Facility: CLINIC | Age: 57
End: 2022-03-25
Payer: COMMERCIAL

## 2022-03-25 VITALS
WEIGHT: 159 LBS | HEIGHT: 66 IN | HEART RATE: 59 BPM | OXYGEN SATURATION: 99 % | BODY MASS INDEX: 25.55 KG/M2 | DIASTOLIC BLOOD PRESSURE: 84 MMHG | SYSTOLIC BLOOD PRESSURE: 130 MMHG

## 2022-03-25 DIAGNOSIS — R33.9 URINARY RETENTION: Primary | ICD-10-CM

## 2022-03-25 DIAGNOSIS — N52.8 OTHER MALE ERECTILE DYSFUNCTION: ICD-10-CM

## 2022-03-25 LAB — POST-VOID RESIDUAL VOLUME, ML POC: 33 ML

## 2022-03-25 PROCEDURE — 99203 OFFICE O/P NEW LOW 30 MIN: CPT | Performed by: PHYSICIAN ASSISTANT

## 2022-03-25 PROCEDURE — 3008F BODY MASS INDEX DOCD: CPT | Performed by: PHYSICIAN ASSISTANT

## 2022-03-25 PROCEDURE — 1036F TOBACCO NON-USER: CPT | Performed by: PHYSICIAN ASSISTANT

## 2022-03-25 PROCEDURE — 51798 US URINE CAPACITY MEASURE: CPT | Performed by: PHYSICIAN ASSISTANT

## 2022-03-25 RX ORDER — SILDENAFIL 25 MG/1
25 TABLET, FILM COATED ORAL AS NEEDED
Qty: 10 TABLET | Refills: 0 | Status: SHIPPED | OUTPATIENT
Start: 2022-03-25 | End: 2022-03-29

## 2022-03-29 DIAGNOSIS — N52.8 OTHER MALE ERECTILE DYSFUNCTION: Primary | ICD-10-CM

## 2022-03-29 RX ORDER — SILDENAFIL CITRATE 20 MG/1
20 TABLET ORAL AS NEEDED
Qty: 10 TABLET | Refills: 2 | Status: SHIPPED | OUTPATIENT
Start: 2022-03-29 | End: 2022-06-17

## 2022-04-01 ENCOUNTER — TELEPHONE (OUTPATIENT)
Dept: UROLOGY | Facility: CLINIC | Age: 57
End: 2022-04-01

## 2022-04-01 NOTE — TELEPHONE ENCOUNTER
Pt called in on back line to ask if his change of script for Sildenafil had been sent to the pharmacy   [facility] and they said it was there , but needed prior auth - called pt back and he said that the pharmacist had actually told him that if the script was changed to the 20 mg and he used the good RX card that it would be cheaper    Called back to [place] and they will process it as cash - pt will  good rx card from us and take to pharmacy - Good RX card left at window #4 for him

## 2022-04-07 ENCOUNTER — TELEPHONE (OUTPATIENT)
Dept: NEUROLOGY | Facility: CLINIC | Age: 57
End: 2022-04-07

## 2022-04-12 ENCOUNTER — HOSPITAL ENCOUNTER (OUTPATIENT)
Dept: MRI IMAGING | Facility: CLINIC | Age: 57
Discharge: HOME | End: 2022-04-12
Payer: COMMERCIAL

## 2022-04-12 ENCOUNTER — OFFICE VISIT (OUTPATIENT)
Dept: NEUROLOGY | Facility: CLINIC | Age: 57
End: 2022-04-12
Payer: COMMERCIAL

## 2022-04-12 VITALS
TEMPERATURE: 98.5 F | OXYGEN SATURATION: 100 % | SYSTOLIC BLOOD PRESSURE: 130 MMHG | BODY MASS INDEX: 25.66 KG/M2 | HEIGHT: 66 IN | DIASTOLIC BLOOD PRESSURE: 80 MMHG | HEART RATE: 73 BPM

## 2022-04-12 DIAGNOSIS — G89.29 CHRONIC RIGHT-SIDED LOW BACK PAIN WITH RIGHT-SIDED SCIATICA: ICD-10-CM

## 2022-04-12 DIAGNOSIS — M54.41 CHRONIC RIGHT-SIDED LOW BACK PAIN WITH RIGHT-SIDED SCIATICA: ICD-10-CM

## 2022-04-12 DIAGNOSIS — M54.2 NECK PAIN: ICD-10-CM

## 2022-04-12 DIAGNOSIS — M54.16 LUMBAR RADICULOPATHY: ICD-10-CM

## 2022-04-12 DIAGNOSIS — G89.4 CHRONIC PAIN SYNDROME: ICD-10-CM

## 2022-04-12 DIAGNOSIS — M54.12 CERVICAL RADICULOPATHY: Primary | ICD-10-CM

## 2022-04-12 DIAGNOSIS — M54.12 CERVICAL RADICULOPATHY: ICD-10-CM

## 2022-04-12 PROCEDURE — 99204 OFFICE O/P NEW MOD 45 MIN: CPT | Performed by: PSYCHIATRY & NEUROLOGY

## 2022-04-12 PROCEDURE — 1036F TOBACCO NON-USER: CPT | Performed by: PSYCHIATRY & NEUROLOGY

## 2022-04-12 PROCEDURE — G1004 CDSM NDSC: HCPCS

## 2022-04-12 PROCEDURE — 72141 MRI NECK SPINE W/O DYE: CPT

## 2022-04-12 PROCEDURE — 72148 MRI LUMBAR SPINE W/O DYE: CPT

## 2022-04-12 NOTE — PROGRESS NOTES
Giancarlo Randolph is a 49 y o  male  Numbness and tingling of right upper and right lower ext    Assessment:  1  Cervical radiculopathy    2  Lumbar radiculopathy        Plan:  Patient is scheduled to have an MRI of the cervical and lumbar spine  Recommend EMG of right upper and right lower extremity  Follow-up in 2 months  Discussion:  Differential diagnosis discussed with the patient most likely cervical radiculopathy and lumbar radiculopathy patient has a prior history of cervical stenosis, he has the MRI of the cervical and lumbar spine scheduled for today we will try to obtain those results also would recommend EMG of right upper and right lower ext  Patient to call me after the test to discuss the results  She he does not want to go on any medications at this time, depending on the above test will decide further management including if patient would benefit from MAC with therapy he is already in a follow-up with the pain specialist and if he does have significant stenosis then he may need to see a spine surgeon, he was advised to avoid strenuous activities, to keep his blood pressure cholesterol and sugar under control to go to the hospital if has any worsening symptoms and call me otherwise to see me back in 2 months and follow up with the other physicians      Subjective:    HPI   Patient is here for evaluation of right arm numbness and tingling and pain and her right leg pain and numbness and tingling sensation, he has had neck pain for the last 10 years radiating to the right arm and has seen Dr Jelani Mathur and Dr Shirin Muñoz in the past and has gone for physical therapy and chiropractic treatment without much relief, he has a history of moderate central canal stenosis at C5-C6, his neck pain radiating to the right arm is about 3-4 on 10 with numbness in the right hand he works as a busby, he also complains of low back pain radiating to the right leg about 5-6 on 10 associated with numbness and tingling he recently saw the pain specialist and was advised to have an MRI of the cervical and lumbar spine which she is scheduled to have it today, sometimes he says that he does not have the urge to urinate for 14-15 hours but denies having any bowel and bladder incontinence, no focal weakness, no symptoms in the left upper and left lower extremity, no history of trauma no other complaints  Vitals:    04/12/22 1054   BP: 130/80   BP Location: Right arm   Patient Position: Sitting   Cuff Size: Adult   Pulse: 73   Temp: 98 5 °F (36 9 °C)   TempSrc: Temporal   SpO2: 100%   Height: 5' 6" (1 676 m)       Current Medications    Current Outpatient Medications:     ergocalciferol (VITAMIN D2) 50,000 units, Take 1 capsule (50,000 Units total) by mouth once a week, Disp: 12 capsule, Rfl: 3    sildenafil (REVATIO) 20 mg tablet, Take 1 tablet (20 mg total) by mouth as needed (erectile dysfunction)   May take up to 5 tablets prior to intercourse if needed  (Patient not taking: Reported on 4/12/2022 ), Disp: 10 tablet, Rfl: 2      Allergies  Patient has no known allergies  Past Medical History  Past Medical History:   Diagnosis Date    Depression with anxiety 1/30/2015    Fatigue 1/30/2015         Past Surgical History:  History reviewed  No pertinent surgical history  Family History:  Family History   Problem Relation Age of Onset    No Known Problems Mother     Diabetes Father     Heart disease Father     Diabetes Sister     Heart disease Sister     Diabetes Brother     Heart disease Brother        Social History:   reports that he has never smoked  He has never used smokeless tobacco  He reports current alcohol use  He reports previous drug use  I have reviewed the past medical history, surgical history, social and family history, current medications, allergies vitals, review of systems, and updated this information as appropriate today     Objective:    Physical Exam    Neurological Exam    GENERAL:  Cooperative in no acute distress  Well-developed and well-nourished    HEAD and NECK   Head is atraumatic normocephalic with no lesions or masses  Neck is supple with full range of motion    CARDIOVASCULAR  Carotid Arteries-no carotid bruits  NEUROLOGIC:  Mental Status-the patient is awake alert and oriented without aphasia or apraxia  Cranial Nerves: Visual fields are full to confrontation  Funduscopic examination could not be done as pupils were not dilated Extraocular movements are full without nystagmus  Pupils are 2-1/2 mm and reactive  Face is symmetrical to light touch  Movements of facial expression move symmetrically  Hearing is normal to finger rub bilaterally  Soft palate lifts symmetrically  Shoulder shrug is symmetrical  Tongue is midline without atrophy  Motor: No drift is noted on arm extension  Strength is full in the upper and lower extremities with normal bulk and tone  Sensory: Intact to temperature and vibratory sensation in the upper and lower extremities bilaterally  Cortical function is intact  Coordination: Finger to nose testing is performed accurately  Romberg is negative  Gait reveals a normal base with symmetrical arm swing  Tandem walk is normal   Reflexes:   2+ and symmetrical    Toes are downgoing  Paraspinal muscle tenderness in the cervical and lumbar area  ROS:  Review of Systems   Constitutional: Negative  Negative for appetite change and fever  HENT: Negative  Negative for hearing loss, tinnitus, trouble swallowing and voice change  Eyes: Negative  Negative for photophobia and pain  Respiratory: Negative  Negative for shortness of breath  Cardiovascular: Negative  Negative for palpitations  Gastrointestinal: Negative  Negative for nausea and vomiting  Endocrine: Negative  Negative for cold intolerance  Genitourinary: Negative  Negative for dysuria, frequency and urgency  Musculoskeletal: Negative    Negative for myalgias and neck pain  Skin: Negative  Negative for rash  Neurological: Positive for numbness and headaches  Negative for dizziness, tremors, seizures, syncope, facial asymmetry, speech difficulty, weakness and light-headedness  Patient states numbness and tingling in right arm, shoulder, and hand  Also legs  Patient states he gets a headache 1-2 times per week  Hematological: Negative  Does not bruise/bleed easily  Psychiatric/Behavioral: Positive for sleep disturbance  Negative for confusion and hallucinations

## 2022-04-19 ENCOUNTER — TELEPHONE (OUTPATIENT)
Dept: RADIOLOGY | Facility: CLINIC | Age: 57
End: 2022-04-19

## 2022-04-19 NOTE — TELEPHONE ENCOUNTER
----- Message from Becky Rodriguez MD sent at 4/19/2022 11:18 AM EDT -----  Regarding: MRI results  MRI of the cervical spine shows facet arthritis as well as degenerative disc disease  MRI of the lumbar spine shows mild facet hypertrophy as well as a small disc herniation at the L5-S1 level  There are also bilateral L4 pars defects with spondylolisthesis  Would he like to start off with addressing his neck or his low back first?

## 2022-04-19 NOTE — TELEPHONE ENCOUNTER
S/W pt and gave results  Pt states his neck and right shoulder bother him more consistently than the LB and right leg, which he states "comes and goes "  States he was wondering about acupuncture as he has spoken to people that say this helps  He mentioned that he did try PT in the past, and has been doing the exercises he remembers while at home    Would not be opposed to returning to PT to see if "there's something new that could help "    Please advise

## 2022-04-19 NOTE — TELEPHONE ENCOUNTER
I would suggest trying a C6-C7 BRENT to see if his pain improves  M50 323, M54 12, M54 2, G89 4, CPT:  [id number]    He can try acupuncture if he wants to see if it helps  It may help certain muscular conditions  I am not opposed to starting PT again, but I want him to start it after the injection if he chooses to go that route

## 2022-04-20 NOTE — TELEPHONE ENCOUNTER
S/w pt and advised same    Pt will think about BRENT but wants to know if it approved  Advised  will obtain auth    Mailed brochure to pt on injection

## 2022-04-21 NOTE — TELEPHONE ENCOUNTER
S/w pt and advised no auth req for BRENT  Scheduled for 5/11/22 1015 am arrival  Gave pre procedure instructions and /vaccine policy  Will mail to pt also  Pt will review procedure info packet and if decides he does not want injection he will call to cxl

## 2022-05-02 ENCOUNTER — TELEPHONE (OUTPATIENT)
Dept: NEUROLOGY | Facility: CLINIC | Age: 57
End: 2022-05-02

## 2022-05-05 ENCOUNTER — TELEPHONE (OUTPATIENT)
Dept: NEUROLOGY | Facility: CLINIC | Age: 57
End: 2022-05-05

## 2022-05-05 NOTE — TELEPHONE ENCOUNTER
Called and spoke with patient and he stated that he will call back the office to [facility]  Due to she is out of the office      Cancelled appointment form 06/23/2022

## 2022-05-06 ENCOUNTER — PROCEDURE VISIT (OUTPATIENT)
Dept: NEUROLOGY | Facility: CLINIC | Age: 57
End: 2022-05-06
Payer: COMMERCIAL

## 2022-05-06 DIAGNOSIS — M54.12 CERVICAL RADICULOPATHY: ICD-10-CM

## 2022-05-06 DIAGNOSIS — M54.16 LUMBAR RADICULOPATHY: ICD-10-CM

## 2022-05-06 PROCEDURE — 95886 MUSC TEST DONE W/N TEST COMP: CPT | Performed by: PSYCHIATRY & NEUROLOGY

## 2022-05-06 PROCEDURE — 95911 NRV CNDJ TEST 9-10 STUDIES: CPT | Performed by: PSYCHIATRY & NEUROLOGY

## 2022-05-06 NOTE — PROGRESS NOTES
EMG 1 Upper/1 Lower Neuropathy     Date/Time 5/6/2022 10:59 AM     Performed by  Bernard Dickson MD     Authorized by Kaylie Gordon MD              [facility] [address], [address] [facility]  [phone]    Electromyography & Nerve Conduction Studies Report          Full Name: Samreen Bundy Gender: Male  MRN: [id number] YOB: 1965      Visit Date: 5/6/2022 10:16 AM  Age: 49 Years  Examining Physician: Denia Rivera MD   Referring Physician: DR Silvia Church  Medical History: temp 32 4    Patient reports symptoms of pain radiating from his neck on the right side into his right arm down to the thumb associated with paresthesia as well as low back pain      Sensory Nerve Conduction Study       Nerve / Sites Rec  Site Onset Lat Peak Lat  Amp Segments Distance Peak Diff Velocity     ms ms µV  cm ms m/s   R Median - Dig II (Antidromic)  Wrist Index 3 0 4 6 15 6 Wrist - Index 14  47      Ref  ?3 4  ? 20 0 Ref  ?50   R Ulnar - Dig V (Antidromic)  Wrist Dig V 2 4 3 5 28 7 Wrist - Dig V 12  50      Ref  ?2 9  ? 17 0 Ref  ?50   R Median, Ulnar - Palmar      Median Palm Wrist 1 6 2 2 35 3 Median Palm - Wrist 8  51      Ref  ?2 2 ? 50 0 Ref  ?50      Ulnar Palm Wrist 1 3 1 9 43 4 Ulnar Palm - Wrist 8  61      Ref  ?2 2 ? 12 0 Ref  ?50        Median Palm - Ulnar Palm  0 3         Ref  ?0 4    R Radial - Superficial (Antidromic)      Forearm Wrist 1 9 2 5 21 1 Forearm - Wrist 10  52      Ref  ?2 9 ? 15 0 Ref  ?50   R Sural - (Antidromic)  Calf Ankle 2 3 2 9 3 3 Calf - Ankle 14  60      Ref  ?3 3  ? 6 0 Ref  ?40       Motor Nerve Conduction Study       Nerve / Sites Muscle Latency Ref  Amplitude Ref  Segments Distance Lat Diff Velocity Ref       ms ms mV mV  cm ms m/s m/s   R Median - APB      Wrist APB 3 4 ?4 4 6 5 ?4 0 Wrist - APB 7         Elbow APB 7 7  6 3  Elbow - Wrist 22 4 27 52 ?49   R Ulnar - ADM      Wrist ADM 3 2 ?3 3 8 1 ?6 0 Wrist - ADM 7         B  Elbow ADM 7 0  8 2  B  Elbow - Wrist 21 3 81 55 ?49      A  Elbow ADM 8 8  7 8  A  Elbow - B  Elbow 10 1 77 56 ?49   R Peroneal - EDB      Ankle EDB 5 5 ?6 5 5 4 ?2 0 Ankle - EDB 9         B  Fib Head EDB 11 3  5 2  B  Fib Head - Ankle 30 5 81 52 ?44      A  Fib Head EDB 13 2  5 1  A  Fib Head - B  Fib Head 10 1 88 53 ?44   R Tibial - AH      Ankle AH 3 6 ?5 8 12 2 ?4 0 Ankle - AH 9         Knee AH 11 9  11 7  Knee - Ankle 38 8 31 46 ?41       F Waves       Nerve F Latency Ref  ms ms   R Peroneal - EDB 47 9 ?56 0   R Tibial - AH 46 5 ?56 0   R Median - APB 26 0 ?31 0   R Ulnar - ADM 27 7 ?32 0       H Reflex       Nerve H Latency    ms   R Tibial - Soleus 33 2   L Tibial - Soleus 32  2       EMG Summary Table     Spontaneous MUAP Recruitment   Muscle Nerve Roots IA Fib PSW Fasc H F  Dur  Amp PPP Config Pattern   R  First dorsal interosseous Ulnar C8-T1 NL None None None None NL NL None NL NL   R  Biceps brachii Musculocut  C5-C6 NL None None None None NL NL None NL NL   R  Brachioradialis Radial C5-C6 NL None None None None NL NL None NL NL   R  Extensor digitorum communis Radial C7-C8 NL None None None None NL NL None NL NL   R  Abductor pollicis brevis Median S8-N0 NL None None None None NL NL None NL NL   R  Triceps brachii Radial C6-C8 NL None None None None NL NL None NL NL   R  Extensor digitorum brevis Tibial L5-S1 NL None None None None NL NL None NL NL   R  Gastrocnemius (Medial head) Tibial S1-S2 NL None None None None NL NL None NL NL   R  Tibialis anterior Deep peroneal (Fibular) L4-L5 NL None None None None NL NL None NL NL   R  Quadriceps Femoral L2-L4 NL None None None None NL NL None NL NL   R  Biceps femoris (short head) Sciatic (peroneal division) L5-S2 NL None None None None NL NL None NL NL   R  Cervical paraspinals Spinal C4-C8 NL None None None None NL NL None NL NL   R   Lumbar paraspinals (low)  - NL None None None None NL NL None NL NL EMG RIGHT/UPPER/LOWER EXTREMITY    Motor and sensory conduction studies were performed on the right median, ulnar, peroneal, tibial and sural nerves  The distal motor latencies were normal  The motor action potential amplitudes were normal  Motor conduction velocities were normal including conduction velocity of the ulnar nerve across the elbow and peroneal nerve across the fibular head  F waves were normal     The right median, radial, ulnar and sural distal sensory latencies were normal with normal palmar conduction with median stimulation  Action potential amplitudes were normal     H  reflexes were symmetrically normal     Concentric needle EMG was performed in the right APB, FDI, EDC, brachial radialis, biceps, triceps, EDB, tibialis anterior, gastrocnemius medius, vastus lateralis, biceps femoralis short head in the  low cervical and lumbar paraspinal regions  There was no evidence of spontaneous activity seen  The compound motor unit potentials were of normal configuration and interference patterns were full were full for effort  IMPRESSION: This is a normal EMG of the right upper and lower extremity      JIMBO Sprague

## 2022-05-11 ENCOUNTER — HOSPITAL ENCOUNTER (OUTPATIENT)
Dept: RADIOLOGY | Facility: CLINIC | Age: 57
Discharge: HOME | End: 2022-05-11
Attending: ANESTHESIOLOGY | Admitting: ANESTHESIOLOGY
Payer: COMMERCIAL

## 2022-05-11 VITALS
HEART RATE: 55 BPM | SYSTOLIC BLOOD PRESSURE: 141 MMHG | RESPIRATION RATE: 20 BRPM | DIASTOLIC BLOOD PRESSURE: 86 MMHG | TEMPERATURE: 97 F | OXYGEN SATURATION: 98 %

## 2022-05-11 DIAGNOSIS — G89.4 CHRONIC PAIN SYNDROME: ICD-10-CM

## 2022-05-11 DIAGNOSIS — M54.2 NECK PAIN: ICD-10-CM

## 2022-05-11 DIAGNOSIS — M50.323 DEGENERATION OF INTERVERTEBRAL DISC AT C6-C7 LEVEL: ICD-10-CM

## 2022-05-11 DIAGNOSIS — M54.12 CERVICAL RADICULOPATHY: ICD-10-CM

## 2022-05-11 PROCEDURE — 62321 NJX INTERLAMINAR CRV/THRC: CPT | Performed by: ANESTHESIOLOGY

## 2022-05-11 RX ORDER — METHYLPREDNISOLONE ACETATE 80 MG/ML
80 INJECTION, SUSPENSION INTRA-ARTICULAR; INTRALESIONAL; INTRAMUSCULAR; PARENTERAL; SOFT TISSUE ONCE
Status: COMPLETED | OUTPATIENT
Start: 2022-05-11 | End: 2022-05-11

## 2022-05-11 RX ORDER — 0.9 % SODIUM CHLORIDE 0.9 %
10 VIAL (ML) INJECTION ONCE
Status: DISCONTINUED | OUTPATIENT
Start: 2022-05-11 | End: 2022-05-15 | Stop reason: HOSPADM

## 2022-05-11 RX ORDER — LIDOCAINE HYDROCHLORIDE 10 MG/ML
5 INJECTION, SOLUTION EPIDURAL; INFILTRATION; INTRACAUDAL; PERINEURAL ONCE
Status: DISCONTINUED | OUTPATIENT
Start: 2022-05-11 | End: 2022-05-15 | Stop reason: HOSPADM

## 2022-05-11 RX ADMIN — METHYLPREDNISOLONE ACETATE 80 MG: 80 INJECTION, SUSPENSION INTRA-ARTICULAR; INTRALESIONAL; INTRAMUSCULAR; PARENTERAL; SOFT TISSUE at 10:20

## 2022-05-11 NOTE — DISCHARGE INSTR - LAB
Epidural Steroid Injection   WHAT YOU NEED TO KNOW:   An epidural steroid injection (JANELL) is a procedure to inject steroid medicine into the epidural space  The epidural space is between your spinal cord and vertebrae  Steroids reduce inflammation and fluid buildup in your spine that may be causing pain  You may be given pain medicine along with the steroids  ACTIVITY  Do not drive or operate machinery today  No strenuous activity today - bending, lifting, etc   You may resume normal activites starting tomorrow - start slowly and as tolerated  You may shower today, but no tub baths or hot tubs  You may have numbness for several hours from the local anesthetic  Please use caution and common sense, especially with weight-bearing activities  CARE OF THE INJECTION SITE  If you have soreness or pain, apply ice to the area today (20 minutes on/20 minutes off)  Starting tomorrow, you may use warm, moist heat or ice if needed  You may have an increase or change in your discomfort for 36-48 hours after your treatment  Apply ice and continue with any pain medication you have been prescribed  Notify the [facility] if you have any of the following: redness, drainage, swelling, headache, stiff neck or fever above 100°F     SPECIAL INSTRUCTIONS  Our office will contact you in approximately 7 days for a progress report  MEDICATIONS  Continue to take all routine medications  Our office may have instructed you to hold some medications  As no general anesthesia was used in today's procedure, you should not experience any side effects related to anesthesia  If you have a problem specifically related to your procedure, please call our office at [phone]  Problems not related to your procedure should be directed to your primary care physician

## 2022-05-11 NOTE — H&P
History of Present Illness: The patient is a 49 y o  male who presents with complaints of neck pain  Patient Active Problem List   Diagnosis    Vitamin D deficiency    Overweight    Cervical radiculopathy    Lumbar radiculopathy    Urinary retention       Past Medical History:   Diagnosis Date    Depression with anxiety 1/30/2015    Fatigue 1/30/2015       No past surgical history on file  Current Outpatient Medications:     ergocalciferol (VITAMIN D2) 50,000 units, Take 1 capsule (50,000 Units total) by mouth once a week, Disp: 12 capsule, Rfl: 3    sildenafil (REVATIO) 20 mg tablet, Take 1 tablet (20 mg total) by mouth as needed (erectile dysfunction)   May take up to 5 tablets prior to intercourse if needed  (Patient not taking: Reported on 4/12/2022 ), Disp: 10 tablet, Rfl: 2    No Known Allergies    Physical Exam: There were no vitals filed for this visit  General: Awake, Alert, Oriented x 3, Mood and affect appropriate  Respiratory: Respirations even and unlabored  Cardiovascular: Peripheral pulses intact; no edema  Musculoskeletal Exam:  Tenderness in cervical spine region    ASA Score: 2         Assessment:   1  Degeneration of intervertebral disc at C6-C7 level    2  Cervical radiculopathy    3  Neck pain    4   Chronic pain syndrome        Plan: C6-C7 BRENT

## 2022-05-18 ENCOUNTER — TELEPHONE (OUTPATIENT)
Dept: PAIN MEDICINE | Facility: CLINIC | Age: 57
End: 2022-05-18

## 2022-06-17 ENCOUNTER — APPOINTMENT (OUTPATIENT)
Dept: LAB | Facility: CLINIC | Age: 57
End: 2022-06-17
Payer: COMMERCIAL

## 2022-06-17 ENCOUNTER — TELEPHONE (OUTPATIENT)
Dept: INTERNAL MEDICINE CLINIC | Facility: CLINIC | Age: 57
End: 2022-06-17

## 2022-06-17 ENCOUNTER — OFFICE VISIT (OUTPATIENT)
Dept: INTERNAL MEDICINE CLINIC | Facility: CLINIC | Age: 57
End: 2022-06-17
Payer: COMMERCIAL

## 2022-06-17 VITALS
SYSTOLIC BLOOD PRESSURE: 122 MMHG | BODY MASS INDEX: 25.39 KG/M2 | HEIGHT: 66 IN | DIASTOLIC BLOOD PRESSURE: 80 MMHG | WEIGHT: 158 LBS | TEMPERATURE: 98 F | HEART RATE: 76 BPM | RESPIRATION RATE: 17 BRPM | OXYGEN SATURATION: 94 %

## 2022-06-17 DIAGNOSIS — E55.9 VITAMIN D DEFICIENCY: ICD-10-CM

## 2022-06-17 DIAGNOSIS — R73.9 HYPERGLYCEMIA: ICD-10-CM

## 2022-06-17 DIAGNOSIS — M54.16 LUMBAR RADICULOPATHY: ICD-10-CM

## 2022-06-17 DIAGNOSIS — M54.12 CERVICAL RADICULOPATHY: Primary | ICD-10-CM

## 2022-06-17 DIAGNOSIS — M54.12 CERVICAL RADICULOPATHY: ICD-10-CM

## 2022-06-17 DIAGNOSIS — R33.9 URINARY RETENTION: ICD-10-CM

## 2022-06-17 LAB
25(OH)D3 SERPL-MCNC: 69.7 NG/ML (ref 30–100)
ALBUMIN SERPL BCP-MCNC: 3.8 G/DL (ref 3.5–5)
ALP SERPL-CCNC: 44 U/L (ref 46–116)
ALT SERPL W P-5'-P-CCNC: 26 U/L (ref 12–78)
ANION GAP SERPL CALCULATED.3IONS-SCNC: 3 MMOL/L (ref 4–13)
AST SERPL W P-5'-P-CCNC: 19 U/L (ref 5–45)
BASOPHILS # BLD AUTO: 0.06 THOUSANDS/ΜL (ref 0–0.1)
BASOPHILS NFR BLD AUTO: 1 % (ref 0–1)
BILIRUB SERPL-MCNC: 0.33 MG/DL (ref 0.2–1)
BUN SERPL-MCNC: 13 MG/DL (ref 5–25)
CALCIUM SERPL-MCNC: 8.9 MG/DL (ref 8.3–10.1)
CHLORIDE SERPL-SCNC: 112 MMOL/L (ref 100–108)
CO2 SERPL-SCNC: 26 MMOL/L (ref 21–32)
CREAT SERPL-MCNC: 1.02 MG/DL (ref 0.6–1.3)
EOSINOPHIL # BLD AUTO: 0.39 THOUSAND/ΜL (ref 0–0.61)
EOSINOPHIL NFR BLD AUTO: 8 % (ref 0–6)
ERYTHROCYTE [DISTWIDTH] IN BLOOD BY AUTOMATED COUNT: 13.2 % (ref 11.6–15.1)
EST. AVERAGE GLUCOSE BLD GHB EST-MCNC: 114 MG/DL
GFR SERPL CREATININE-BSD FRML MDRD: 82 ML/MIN/1.73SQ M
GLUCOSE P FAST SERPL-MCNC: 97 MG/DL (ref 65–99)
HBA1C MFR BLD: 5.6 %
HCT VFR BLD AUTO: 41.5 % (ref 36.5–49.3)
HGB BLD-MCNC: 13.5 G/DL (ref 12–17)
IMM GRANULOCYTES # BLD AUTO: 0.01 THOUSAND/UL (ref 0–0.2)
IMM GRANULOCYTES NFR BLD AUTO: 0 % (ref 0–2)
LYMPHOCYTES # BLD AUTO: 0.92 THOUSANDS/ΜL (ref 0.6–4.47)
LYMPHOCYTES NFR BLD AUTO: 19 % (ref 14–44)
MCH RBC QN AUTO: 32.6 PG (ref 26.8–34.3)
MCHC RBC AUTO-ENTMCNC: 32.5 G/DL (ref 31.4–37.4)
MCV RBC AUTO: 100 FL (ref 82–98)
MONOCYTES # BLD AUTO: 0.46 THOUSAND/ΜL (ref 0.17–1.22)
MONOCYTES NFR BLD AUTO: 10 % (ref 4–12)
NEUTROPHILS # BLD AUTO: 2.97 THOUSANDS/ΜL (ref 1.85–7.62)
NEUTS SEG NFR BLD AUTO: 62 % (ref 43–75)
NRBC BLD AUTO-RTO: 0 /100 WBCS
PLATELET # BLD AUTO: 232 THOUSANDS/UL (ref 149–390)
PMV BLD AUTO: 10.6 FL (ref 8.9–12.7)
POTASSIUM SERPL-SCNC: 4 MMOL/L (ref 3.5–5.3)
PROT SERPL-MCNC: 6.9 G/DL (ref 6.4–8.2)
RBC # BLD AUTO: 4.14 MILLION/UL (ref 3.88–5.62)
SODIUM SERPL-SCNC: 141 MMOL/L (ref 136–145)
WBC # BLD AUTO: 4.81 THOUSAND/UL (ref 4.31–10.16)

## 2022-06-17 PROCEDURE — 99214 OFFICE O/P EST MOD 30 MIN: CPT | Performed by: INTERNAL MEDICINE

## 2022-06-17 PROCEDURE — 80053 COMPREHEN METABOLIC PANEL: CPT

## 2022-06-17 PROCEDURE — 82306 VITAMIN D 25 HYDROXY: CPT

## 2022-06-17 PROCEDURE — 83036 HEMOGLOBIN GLYCOSYLATED A1C: CPT

## 2022-06-17 PROCEDURE — 3008F BODY MASS INDEX DOCD: CPT | Performed by: INTERNAL MEDICINE

## 2022-06-17 PROCEDURE — 36415 COLL VENOUS BLD VENIPUNCTURE: CPT

## 2022-06-17 PROCEDURE — 1036F TOBACCO NON-USER: CPT | Performed by: INTERNAL MEDICINE

## 2022-06-17 PROCEDURE — 85025 COMPLETE CBC W/AUTO DIFF WBC: CPT

## 2022-06-17 NOTE — TELEPHONE ENCOUNTER
----- Message from Larissa Oliveira MD sent at 6/17/2022  3:45 PM EDT -----   Labs are okay    You can discontinue the high-dose vitamin-D and take vitamin-D 2000 units daily over-the-counter

## 2022-06-17 NOTE — PROGRESS NOTES
INTERNAL MEDICINE OFFICE VISIT  [facility] AT [address]  Tel: [phone]      NAME: Paty Napoles  AGE: 49 y o  SEX: male  : 1965   MRN: [id number]    DATE: 2022  TIME: 8:33 AM      Assessment and Plan:  1  Cervical radiculopathy   was told to follow-up with Pain Management and Neurology  - CBC and differential; Future  - Comprehensive metabolic panel; Future    2  Lumbar radiculopathy   continue with back exercises, does not want to go for physical therapy as yet    3  Hyperglycemia   was told to cut down on the carbohydrates, check a hemoglobin A1c level  - Hemoglobin A1C; Future    4  Vitamin D deficiency    - Vitamin D 25 hydroxy; Future    5  Urinary retention   follow-up with urology      - Counseling Documentation: patient was counseled regarding: diagnostic results, instructions for management, risk factor reductions, prognosis, patient and family education, risks and benefits of treatment options and importance of compliance with treatment  - Medication Side Effects: Adverse side effects of medications were reviewed with the patient/guardian today  Return for follow up visit in  6 months or earlier, if needed  Chief Complaint:  Chief Complaint   Patient presents with    Follow-up     4 months         History of Present Illness:    his neck is much better after he had a steroid shot in the neck by pain management  For his back he does not want to go for physical therapy and wants to try exercises at home which helped him in the past     His fasting glucose was high and I would like to check a hemoglobin A1c level      If the vitamin-D level is better, he may be switched to over-the-counter lower dose of vitamin-D   His urinary retention is better      Active Problem List:  Patient Active Problem List   Diagnosis    Vitamin D deficiency    Overweight    Cervical radiculopathy    Lumbar radiculopathy    Urinary retention    Hyperglycemia         Past Medical History:  Past Medical History:   Diagnosis Date    Depression with anxiety 1/30/2015    Fatigue 1/30/2015         Past Surgical History:  History reviewed  No pertinent surgical history        Family History:  Family History   Problem Relation Age of Onset    No Known Problems Mother     Diabetes Father     Heart disease Father     Diabetes Sister     Heart disease Sister     Diabetes Brother     Heart disease Brother          Social History:  Social History     Socioeconomic History    Marital status: /Civil Union     Spouse name: None    Number of children: None    Years of education: None    Highest education level: None   Occupational History    None   Tobacco Use    Smoking status: Never Smoker    Smokeless tobacco: Never Used   Vaping Use    Vaping Use: Never used   Substance and Sexual Activity    Alcohol use: Yes     Comment: occasionally    Drug use: Not Currently    Sexual activity: None   Other Topics Concern    None   Social History Narrative    None     Social Determinants of Health     Financial Resource Strain: Not on file   Food Insecurity: Not on file   Transportation Needs: Not on file   Physical Activity: Insufficiently Active    Days of Exercise per Week: 4 days    Minutes of Exercise per Session: 30 min   Stress: No Stress Concern Present    Feeling of Stress : Not at all   Social Connections: Not on file   Intimate Partner Violence: Not on file   Housing Stability: Not on file         Allergies:  No Known Allergies      Medications:    Current Outpatient Medications:     ergocalciferol (VITAMIN D2) 50,000 units, Take 1 capsule (50,000 Units total) by mouth once a week, Disp: 12 capsule, Rfl: 3      The following portions of the patient's history were reviewed and updated as appropriate: past medical history, past surgical history, family history, social history, allergies, current medications and active problem list       Review of Systems:  Constitutional: Denies fever, chills, weight gain, weight loss, fatigue  Eyes: Denies eye redness, eye discharge, double vision, change in visual acuity  ENT: Denies hearing loss, tinnitus, sneezing, nasal congestion, nasal discharge, sore throat   Respiratory: Denies cough, expectoration, hemoptysis, shortness of breath, wheezing  Cardiovascular: Denies chest pain, palpitations, lower extremity swelling, orthopnea, PND  Gastrointestinal: Denies abdominal pain, heartburn, nausea, vomiting, hematemesis, diarrhea, bloody stools  Genito-Urinary: Denies dysuria, frequency, difficulty in micturition, nocturia, incontinence  Musculoskeletal: Denies back pain, joint pain, muscle pain  Neurologic: Denies confusion, lightheadedness, syncope, headache, focal weakness, sensory changes, seizures  Endocrine: Denies polyuria, polydipsia, temperature intolerance  Allergy and Immunology: Denies hives, insect bite sensitivity  Hematological and Lymphatic: Denies bleeding problems, swollen glands   Psychological: Denies depression, suicidal ideation, anxiety, panic, mood swings  Dermatological: Denies pruritus, rash, skin lesion changes      Vitals:  Vitals:    06/17/22 0808   BP: 122/80   Pulse: 76   Resp: 17   Temp: 98 °F (36 7 °C)   SpO2: 94%       Body mass index is 25 5 kg/m²  Weight (last 2 days)     Date/Time Weight    06/17/22 0808 71 7 (158)            Physical Examination:  General: Patient is not in acute distress  Awake, alert, responding to commands  No weight gain or loss  Head: Normocephalic  Atraumatic  Eyes: Conjunctiva and lids with no swelling, erythema or discharge  Both pupils normal sized, round and reactive to light  Sclera nonicteric  ENT: External examination of nose and ear normal  Otoscopic examination shows translucent tympanic membranes with patent canals without erythema  Oropharynx moist with no erythema, edema, exudate or lesions  Neck: Supple  JVP not raised  Trachea midline  No masses  No thyromegaly  Lungs: No signs of increased work of breathing or respiratory distress  Bilateral bronchovascular breath sounds with no crackles or rhonchi  Chest wall: No tenderness  Cardiovascular: Normal PMI  No thrills  Regular rate and rhythm  S1 and S2 normal  No murmur, rub or gallop  Gastrointestinal: Abdomen soft, nontender  No guarding or rigidity  Liver and spleen not palpable  Bowel sounds present  Neurologic: Cranial nerves II-XII intact   Cortical functions normal  Motor system - Reflexes 2+ and symmetrical  Sensations normal  Musculoskeletal: Gait normal  No joint tenderness  Integumentary: Skin normal with no rash or lesions  Lymphatic: No palpable lymph nodes in neck, axilla or groin  Extremities: No clubbing, cyanosis, edema or varicosities  Psychological: Judgement and insight normal  Mood and affect normal      Laboratory Results:  CBC with diff:   Lab Results   Component Value Date    WBC 4 67 02/22/2022    WBC 7 6 01/30/2015    RBC 4 41 02/22/2022    RBC 4 54 01/30/2015    HGB 14 3 02/22/2022    HGB 15 1 01/30/2015    HCT 42 0 02/22/2022    HCT 44 4 01/30/2015    MCV 95 02/22/2022    MCV 98 01/30/2015    MCH 32 4 02/22/2022    MCH 33 2 01/30/2015    RDW 12 2 02/22/2022    RDW 11 5 01/30/2015     02/22/2022     01/30/2015       CMP:  Lab Results   Component Value Date    CREATININE 1 07 02/22/2022    CREATININE 0 9 01/30/2015    BUN 19 02/22/2022    BUN 12 01/30/2015     01/30/2015    K 4 4 02/22/2022    K 4 1 01/30/2015     (H) 02/22/2022     01/30/2015    CO2 27 02/22/2022    CO2 30 8 01/30/2015    GLUCOSE 145 (H) 01/30/2015    PROT 7 1 01/30/2015    ALKPHOS 55 02/22/2022    ALKPHOS 68 01/30/2015    ALT 27 02/22/2022    ALT 23 01/30/2015    AST 26 02/22/2022    AST 18 01/30/2015       No results found for: HGBA1C, MG, PHOS    No results found for: TROPONINI, CKMB, CKTOTAL    Lipid Profile:   No results found for: CHOL  Lab Results Component Value Date    HDL 55 02/22/2022     Lab Results   Component Value Date    LDLCALC 102 (H) 02/22/2022     Lab Results   Component Value Date    TRIG 70 02/22/2022       Imaging Results:  FL spine and pain procedure  ATTENDING PHYSICIAN:  Princess Fausto MD     PROCEDURE:  Cervical epidural steroid injection with steroid and local   anesthetic under fluoroscopy at the C6-C7 level  PREPROCEDURE DIAGNOSIS:  Neck pain  POSTPROCEDURE DIAGNOSIS:  Neck pain  ANESTHESIA:  Local     ESTIMATED BLOOD LOSS:  Minimal     COMPLICATIONS:  None  LOCATION:  [facility]  CONSENT:  Today's procedure, its potential benefits as well as its risks   and potential side effects were reviewed  Discussed risks of the procedure   including bleeding, infection, nerve irritation or damage, reactions to   the medications, headache, failure of the pain to improve, and potential   worsening of the pain were explained to the patient who verbalized   understanding and who wished to proceed  Written informed consent is   thereby obtained  DESCRIPTION OF THE PROCEDURE:  After written informed consent was   obtained, the patient was taken to the fluoroscopy suite and placed in the   prone position  Anatomical landmarks were identified by way of fluoroscopy   in multiple views  The skin of the cervical region was prepped and draped   in the usual sterile fashion  Strict aseptic technique was utilized  The   skin and subcutaneous tissues at the needle entry site were infiltrated   with 3 mL of 1% preservative-free lidocaine using a 25-gauge 1-1/2-inch   needle  A 20-gauge Tuohy needle was then incrementally advanced under   fluoroscopy using a loss of resistance technique  Upon entering into the   epidural space, a positive loss of resistance to air was noted and a   characteristic "pop" was felt   Proper placement into the epidural space   was confirmed with a hanging column technique where preservative-free   normal saline was noted to flow freely to gravity in to the epidural space   as well as by the administration of contrast to delineate the epidural   space  There were no paresthesias reported  After negative aspiration for   CSF or heme, a 6 mL injectate consisting of 1 mL of Depo-Medrol 80 mg/mL   mixed with 5 mL of preservative-free normal saline was slowly injected  The patient tolerated the procedure well and all needles were removed with   the tips intact  Hemostasis was maintained  There were no apparent   paresthesias or complications  The skin was wiped clean and a Band-Aid was   placed as appropriate  The patient was monitored for an appropriate period   of time following the procedure and remained hemodynamically stable and   neurovascularly intact following the procedure  The patient was ultimately   discharged to home with supervision in good condition and instructed to   call the office in a few days for an update or sooner as warranted  I was present for and participated in all key and critical portions of   this procedure      Marijean Lesches, MD  5/11/2022  10:27 AM       Health Maintenance:  Health Maintenance   Topic Date Due    Annual Physical  Never done    DTaP,Tdap,and Td Vaccines (1 - Tdap) Never done    COVID-19 Vaccine (3 - Booster for Moderna series) 10/27/2021    Influenza Vaccine (Season Ended) 09/01/2022    Depression Screening  02/17/2023    BMI: Followup Plan  02/17/2023    BMI: Adult  06/17/2023    Colorectal Cancer Screening  03/18/2032    HIV Screening  Completed    Hepatitis C Screening  Completed    Pneumococcal Vaccine: Pediatrics (0 to 5 Years) and At-Risk Patients (6 to 59 Years)  Aged Out    HIB Vaccine  Aged Out    Hepatitis B Vaccine  Aged Out    IPV Vaccine  Aged Out    Hepatitis A Vaccine  Aged Out    Meningococcal ACWY Vaccine  Aged Out    HPV Vaccine  Aged [employer] History   Administered Date(s) Administered   [facility] COVID-19 MODERNA VACC 0 5 ML IM 04/29/2021, 05/27/2021         Ynes Moreno MD  6/17/2022,8:33 AM

## 2024-05-07 ENCOUNTER — OFFICE VISIT (OUTPATIENT)
Age: 59
End: 2024-05-07
Payer: COMMERCIAL

## 2024-05-07 ENCOUNTER — APPOINTMENT (OUTPATIENT)
Age: 59
End: 2024-05-07
Payer: COMMERCIAL

## 2024-05-07 VITALS
TEMPERATURE: 95.5 F | DIASTOLIC BLOOD PRESSURE: 70 MMHG | HEART RATE: 75 BPM | BODY MASS INDEX: 25.81 KG/M2 | SYSTOLIC BLOOD PRESSURE: 122 MMHG | WEIGHT: 160.6 LBS | HEIGHT: 66 IN | OXYGEN SATURATION: 99 %

## 2024-05-07 DIAGNOSIS — E66.3 OVERWEIGHT: ICD-10-CM

## 2024-05-07 DIAGNOSIS — Z13.220 ENCOUNTER FOR LIPID SCREENING FOR CARDIOVASCULAR DISEASE: ICD-10-CM

## 2024-05-07 DIAGNOSIS — J02.9 PHARYNGITIS, UNSPECIFIED ETIOLOGY: Primary | ICD-10-CM

## 2024-05-07 DIAGNOSIS — R73.9 HYPERGLYCEMIA: ICD-10-CM

## 2024-05-07 DIAGNOSIS — Z13.6 ENCOUNTER FOR LIPID SCREENING FOR CARDIOVASCULAR DISEASE: ICD-10-CM

## 2024-05-07 DIAGNOSIS — R53.83 OTHER FATIGUE: ICD-10-CM

## 2024-05-07 LAB
ANION GAP SERPL CALCULATED.3IONS-SCNC: 9 MMOL/L (ref 4–13)
BASOPHILS # BLD AUTO: 0.04 THOUSANDS/ÂΜL (ref 0–0.1)
BASOPHILS NFR BLD AUTO: 1 % (ref 0–1)
BUN SERPL-MCNC: 15 MG/DL (ref 5–25)
CALCIUM SERPL-MCNC: 9.3 MG/DL (ref 8.4–10.2)
CHLORIDE SERPL-SCNC: 103 MMOL/L (ref 96–108)
CHOLEST SERPL-MCNC: 182 MG/DL
CO2 SERPL-SCNC: 29 MMOL/L (ref 21–32)
CREAT SERPL-MCNC: 1 MG/DL (ref 0.6–1.3)
EOSINOPHIL # BLD AUTO: 0.27 THOUSAND/ÂΜL (ref 0–0.61)
EOSINOPHIL NFR BLD AUTO: 5 % (ref 0–6)
ERYTHROCYTE [DISTWIDTH] IN BLOOD BY AUTOMATED COUNT: 13.3 % (ref 11.6–15.1)
EST. AVERAGE GLUCOSE BLD GHB EST-MCNC: 117 MG/DL
GFR SERPL CREATININE-BSD FRML MDRD: 83 ML/MIN/1.73SQ M
GLUCOSE P FAST SERPL-MCNC: 95 MG/DL (ref 65–99)
HBA1C MFR BLD: 5.7 %
HCT VFR BLD AUTO: 43.7 % (ref 36.5–49.3)
HDLC SERPL-MCNC: 63 MG/DL
HGB BLD-MCNC: 14.4 G/DL (ref 12–17)
IMM GRANULOCYTES # BLD AUTO: 0.01 THOUSAND/UL (ref 0–0.2)
IMM GRANULOCYTES NFR BLD AUTO: 0 % (ref 0–2)
LDLC SERPL CALC-MCNC: 86 MG/DL (ref 0–100)
LYMPHOCYTES # BLD AUTO: 0.77 THOUSANDS/ÂΜL (ref 0.6–4.47)
LYMPHOCYTES NFR BLD AUTO: 13 % (ref 14–44)
MCH RBC QN AUTO: 32.7 PG (ref 26.8–34.3)
MCHC RBC AUTO-ENTMCNC: 33 G/DL (ref 31.4–37.4)
MCV RBC AUTO: 99 FL (ref 82–98)
MONOCYTES # BLD AUTO: 0.49 THOUSAND/ÂΜL (ref 0.17–1.22)
MONOCYTES NFR BLD AUTO: 8 % (ref 4–12)
NEUTROPHILS # BLD AUTO: 4.34 THOUSANDS/ÂΜL (ref 1.85–7.62)
NEUTS SEG NFR BLD AUTO: 73 % (ref 43–75)
NONHDLC SERPL-MCNC: 119 MG/DL
NRBC BLD AUTO-RTO: 0 /100 WBCS
PLATELET # BLD AUTO: 211 THOUSANDS/UL (ref 149–390)
PMV BLD AUTO: 11.7 FL (ref 8.9–12.7)
POTASSIUM SERPL-SCNC: 4.3 MMOL/L (ref 3.5–5.3)
RBC # BLD AUTO: 4.4 MILLION/UL (ref 3.88–5.62)
SODIUM SERPL-SCNC: 141 MMOL/L (ref 135–147)
TRIGL SERPL-MCNC: 164 MG/DL
WBC # BLD AUTO: 5.92 THOUSAND/UL (ref 4.31–10.16)

## 2024-05-07 PROCEDURE — 80061 LIPID PANEL: CPT

## 2024-05-07 PROCEDURE — 80048 BASIC METABOLIC PNL TOTAL CA: CPT

## 2024-05-07 PROCEDURE — 99214 OFFICE O/P EST MOD 30 MIN: CPT

## 2024-05-07 PROCEDURE — 83036 HEMOGLOBIN GLYCOSYLATED A1C: CPT

## 2024-05-07 PROCEDURE — 36415 COLL VENOUS BLD VENIPUNCTURE: CPT

## 2024-05-07 PROCEDURE — 85025 COMPLETE CBC W/AUTO DIFF WBC: CPT

## 2024-05-07 RX ORDER — AZITHROMYCIN 250 MG/1
TABLET, FILM COATED ORAL DAILY
Qty: 6 TABLET | Refills: 0 | Status: SHIPPED | OUTPATIENT
Start: 2024-05-07 | End: 2024-05-07

## 2024-05-07 RX ORDER — BENZONATATE 200 MG/1
200 CAPSULE ORAL 3 TIMES DAILY PRN
Qty: 30 CAPSULE | Refills: 0 | Status: SHIPPED | OUTPATIENT
Start: 2024-05-07

## 2024-05-07 RX ORDER — AZITHROMYCIN 250 MG/1
TABLET, FILM COATED ORAL DAILY
Qty: 6 TABLET | Refills: 0 | Status: SHIPPED | OUTPATIENT
Start: 2024-05-07 | End: 2024-05-12

## 2024-05-07 RX ORDER — BENZONATATE 100 MG/1
200 CAPSULE ORAL 2 TIMES DAILY PRN
Qty: 20 CAPSULE | Refills: 0 | Status: SHIPPED | OUTPATIENT
Start: 2024-05-07 | End: 2024-05-07

## 2024-05-07 NOTE — PROGRESS NOTES
Name: Fer Snider      : 1965      MRN: [id number]  Encounter Provider: Osman Arrington PA-C  Encounter Date: 2024   Encounter department: [facility] [place]    Assessment & Plan     1. Pharyngitis, unspecified etiology  Comments:  Continue stay well hydrated and rest. F/u if new or worsening symptoms develop  Orders:  -     azithromycin (Zithromax) 250 mg tablet; Take 2 tablets (500 mg total) by mouth daily for 1 day, THEN 1 tablet (250 mg total) daily for 4 days.  -     benzonatate (TESSALON) 200 MG capsule; Take 1 capsule (200 mg total) by mouth 3 (three) times a day as needed for cough    2. Other fatigue  -     CBC and differential; Future  -     Basic metabolic panel; Future    3. Overweight  -     CBC and differential; Future  -     Basic metabolic panel; Future  -     Hemoglobin A1C; Future  -     Lipid panel; Future    4. Hyperglycemia  Comments:  in history, labs ordered, will discuss at wellness visit  Orders:  -     Hemoglobin A1C; Future    5. Encounter for lipid screening for cardiovascular disease  -     Lipid panel; Future           Subjective      Deep cough for 1 month, and sore throat that does not go away, was with friends 5-6 weeks ago who tested + for covid.  Tested at home few weeks ago was negative. Has scratchy raw throat, cough, fatigue.   Took an otc spray for throat to numb. No other meds taken. No fevers.     Cough  Associated symptoms include postnasal drip, rhinorrhea and a sore throat. Pertinent negatives include no chest pain, ear pain, fever or shortness of breath.     Review of Systems   Constitutional:  Positive for fatigue. Negative for activity change, appetite change, fever and unexpected weight change.   HENT:  Positive for congestion (some in morning. All day basically a dry cough), postnasal drip, rhinorrhea and sore throat. Negative for ear discharge, ear pain, facial swelling, hearing loss, sinus pressure, sinus pain, sneezing,  "trouble swallowing and voice change.    Eyes: Negative.    Respiratory:  Positive for cough. Negative for chest tightness and shortness of breath.    Cardiovascular:  Negative for chest pain, palpitations and leg swelling.   Gastrointestinal: Negative.    Endocrine: Negative.    Genitourinary: Negative.    Musculoskeletal: Negative.    Skin: Negative.    Neurological: Negative.    Hematological: Negative.    Psychiatric/Behavioral: Negative.         Current Outpatient Medications on File Prior to Visit   Medication Sig    ergocalciferol (VITAMIN D2) 50,000 units Take 1 capsule (50,000 Units total) by mouth once a week (Patient not taking: Reported on 5/7/2024)       Objective     /70   Pulse 75   Temp (!) 95.5 °F (35.3 °C)   Ht 5' 6\" (1.676 m)   Wt 72.8 kg (160 lb 9.6 oz)   SpO2 99%   BMI 25.92 kg/m²     Physical Exam  Vitals and nursing note reviewed.   Constitutional:       General: He is not in acute distress.     Appearance: Normal appearance. He is not ill-appearing, toxic-appearing or diaphoretic.   HENT:      Head: Normocephalic and atraumatic.      Right Ear: Tympanic membrane, ear canal and external ear normal. There is no impacted cerumen.      Left Ear: Tympanic membrane, ear canal and external ear normal. There is no impacted cerumen.      Nose: Nose normal. No congestion or rhinorrhea.      Mouth/Throat:      Mouth: Mucous membranes are moist.      Pharynx: Oropharynx is clear. No oropharyngeal exudate or posterior oropharyngeal erythema.   Eyes:      General: No scleral icterus.        Right eye: No discharge.         Left eye: No discharge.      Extraocular Movements: Extraocular movements intact.      Conjunctiva/sclera: Conjunctivae normal.      Pupils: Pupils are equal, round, and reactive to light.   Cardiovascular:      Rate and Rhythm: Normal rate and regular rhythm.      Pulses: Normal pulses.      Heart sounds: Normal heart sounds. No murmur heard.  Pulmonary:      Effort: " Pulmonary effort is normal.      Breath sounds: Normal breath sounds.   Abdominal:      General: Abdomen is flat. Bowel sounds are normal.      Palpations: There is no mass.      Tenderness: There is no abdominal tenderness. There is no right CVA tenderness, left CVA tenderness or guarding.      Hernia: No hernia is present.   Musculoskeletal:         General: No deformity. Normal range of motion.      Right lower leg: No edema.      Left lower leg: No edema.   Skin:     General: Skin is warm and dry.      Capillary Refill: Capillary refill takes less than 2 seconds.      Findings: No lesion or rash.   Neurological:      Mental Status: He is alert. Mental status is at baseline.   Psychiatric:         Mood and Affect: Mood normal.         Behavior: Behavior normal.         Thought Content: Thought content normal.         Judgment: Judgment normal.       Osman Arrington PA-C

## 2024-05-08 ENCOUNTER — TELEPHONE (OUTPATIENT)
Age: 59
End: 2024-05-08

## 2024-05-08 NOTE — TELEPHONE ENCOUNTER
----- Message from Osman Arrington PA-C sent at 5/8/2024 12:41 PM EDT -----  Please let patient know labs looked good except A1C in prediabetic range at 5.7. We can just continue to monitor this with improved diet and activity. We can talk more about it at next appt.

## 2024-05-08 NOTE — TELEPHONE ENCOUNTER
Re:   lab results   Provider's message relayed in full detail    Left voice mail asking pt to return call w/any questions.

## 2024-10-07 PROBLEM — R73.03 PREDIABETES: Status: ACTIVE | Noted: 2022-06-17

## 2024-10-07 PROBLEM — R73.03 PREDIABETES: Status: ACTIVE | Noted: 2024-10-07

## 2024-10-08 ENCOUNTER — OFFICE VISIT (OUTPATIENT)
Age: 59
End: 2024-10-08
Payer: COMMERCIAL

## 2024-10-08 ENCOUNTER — APPOINTMENT (OUTPATIENT)
Age: 59
End: 2024-10-08
Payer: COMMERCIAL

## 2024-10-08 VITALS
HEART RATE: 74 BPM | OXYGEN SATURATION: 98 % | WEIGHT: 159.6 LBS | SYSTOLIC BLOOD PRESSURE: 120 MMHG | HEIGHT: 66 IN | BODY MASS INDEX: 25.65 KG/M2 | TEMPERATURE: 93.7 F | DIASTOLIC BLOOD PRESSURE: 74 MMHG

## 2024-10-08 DIAGNOSIS — D49.2 NEOPLASM OF SKIN: ICD-10-CM

## 2024-10-08 DIAGNOSIS — G89.29 CHRONIC RIGHT SHOULDER PAIN: ICD-10-CM

## 2024-10-08 DIAGNOSIS — R73.03 PREDIABETES: ICD-10-CM

## 2024-10-08 DIAGNOSIS — E78.1 HYPERTRIGLYCERIDEMIA: ICD-10-CM

## 2024-10-08 DIAGNOSIS — E55.9 VITAMIN D DEFICIENCY: ICD-10-CM

## 2024-10-08 DIAGNOSIS — M25.511 CHRONIC RIGHT SHOULDER PAIN: ICD-10-CM

## 2024-10-08 DIAGNOSIS — Z00.00 ANNUAL PHYSICAL EXAM: Primary | ICD-10-CM

## 2024-10-08 PROCEDURE — 99396 PREV VISIT EST AGE 40-64: CPT

## 2024-10-08 PROCEDURE — 11102 TANGNTL BX SKIN SINGLE LES: CPT

## 2024-10-08 PROCEDURE — 99214 OFFICE O/P EST MOD 30 MIN: CPT

## 2024-10-08 PROCEDURE — 88305 TISSUE EXAM BY PATHOLOGIST: CPT | Performed by: PATHOLOGY

## 2024-10-08 PROCEDURE — 73030 X-RAY EXAM OF SHOULDER: CPT

## 2024-10-08 NOTE — ASSESSMENT & PLAN NOTE
Discussed dx, A1C 5.7.   Continue to watch carbs and fatty foods. Continue increased activity and weight management   Explained pathophys of obesity and insulin resistance, and importance to maintain a healthy weight   Orders:    CBC and differential; Future    Comprehensive metabolic panel; Future

## 2024-10-08 NOTE — ASSESSMENT & PLAN NOTE
Hasn't been taking vitamin d in awhile  No symptoms, Recheck next lab draw in 8-10 months before next physical   Orders:    Vitamin D 25 hydroxy; Future

## 2024-10-08 NOTE — PROGRESS NOTES
Adult Annual Physical  Name: Fer Snider      : 1965      MRN: [id number]  Encounter Provider: Osman Arrington PA-C  Encounter Date: 10/8/2024   Encounter department: [facility] [place]    Assessment & Plan  Annual physical exam  - Medical history reviewed, including existing medical conditions, medications, and surgeries.   - Labs discussed to evaluate cholesterol, blood sugar, kidney function, liver function, and other important markers of health.  - BMI evaluated and discussed.  - Cancer screenings discussed and recommended.  - Vaccination status reviewed and pertinent immunizations and booster shots discussed.  - Bone health reviewed.   - Skin examination.  Discussed importance of sunscreen and other preventative measures for skin cancer. Reviewed abcds melanoma and scc,bcc,ak characteristics to look for  - Lifestyle and health counseling completed including diet, exercise habits, smoking status, alcohol consumption.   - Mental health and wellbeing evaluated and discussed.  - Family history obtained to identify any of hereditary health risks.  All screenings and vaccinations have been reviewed, with benefits and risks discussed. All questions were answered. Appropriate orders have been placed as patient agrees to        Prediabetes  Discussed dx, A1C 5.7.   Continue to watch carbs and fatty foods. Continue increased activity and weight management   Explained pathophys of obesity and insulin resistance, and importance to maintain a healthy weight   Orders:    CBC and differential; Future    Comprehensive metabolic panel; Future    Vitamin D deficiency  Hasn't been taking vitamin d in awhile  No symptoms, Recheck next lab draw in 8-10 months before next physical   Orders:    Vitamin D 25 hydroxy; Future    Chronic right shoulder pain    Orders:    XR shoulder 2+ vw right; Future    Ambulatory Referral to Orthopedic Surgery; Future    Neoplasm of skin  Patient concerned right  cheek neoplasm described below enlarging slightly. Has a friend with recent skin cancer dx than was found on the right cheek   Orders:    Tissue Exam    Biopsy    Hypertriglyceridemia    Orders:    Lipid panel; Future    Immunizations and preventive care screenings were discussed with patient today. Appropriate education was printed on patient's after visit summary.    Discussed risks and benefits of prostate cancer screening. We discussed the controversial history of PSA screening for prostate cancer in the [place] as well as the risk of over detection and over treatment of prostate cancer by way of PSA screening.  The patient understands that PSA blood testing is an imperfect way to screen for prostate cancer and that elevated PSA levels in the blood may also be caused by infection, inflammation, prostatic trauma or manipulation, urological procedures, or by benign prostatic enlargement.    The role of the digital rectal examination in prostate cancer screening was also discussed and I discussed with him that there is large interobserver variability in the findings of digital rectal examination.    Counseling:  Alcohol/drug use: discussed moderation in alcohol intake, the recommendations for healthy alcohol use, and avoidance of illicit drug use.  Dental Health: discussed importance of regular tooth brushing, flossing, and dental visits.  Injury prevention: discussed safety/seat belts, safety helmets, smoke detectors, carbon monoxide detectors, and smoking near bedding or upholstery.  Sexual health: discussed sexually transmitted diseases, partner selection, use of condoms, avoidance of unintended pregnancy, and contraceptive alternatives.  Exercise: the importance of regular exercise/physical activity was discussed. Recommend exercise 3-5 times per week for at least 30 minutes.          History of Present Illness     Adult Annual Physical:  Patient presents for annual physical.     Diet and Physical  Activity:  - Diet/Nutrition: well balanced diet.  - Exercise: moderate cardiovascular exercise. Active at work, [employer]    General Health:  - Sleep: sleeps well.  - Hearing: normal hearing right ear and normal hearing left ear.  - Vision: no vision problems.     Health:    - Urinary symptoms: none.     Advanced Care Planning:  - Has an advanced directive?: no    - Has a durable medical POA?: no    - ACP document given to patient?: no      Review of Systems  Medical History Reviewed by provider this encounter:  Tobacco  Allergies  Meds  Problems  Med Hx  Surg Hx  Fam Hx       Past Medical History   Past Medical History:   Diagnosis Date    Depression with anxiety 1/30/2015    Fatigue 1/30/2015     History reviewed. No pertinent surgical history.  Family History   Problem Relation Age of Onset    No Known Problems Mother     Diabetes Father     Heart disease Father     Diabetes Sister     Heart disease Sister     Diabetes Brother     Heart disease Brother      Current Outpatient Medications on File Prior to Visit   Medication Sig Dispense Refill    [DISCONTINUED] benzonatate (TESSALON) 200 MG capsule Take 1 capsule (200 mg total) by mouth 3 (three) times a day as needed for cough (Patient not taking: Reported on 10/8/2024) 30 capsule 0    [DISCONTINUED] ergocalciferol (VITAMIN D2) 50,000 units Take 1 capsule (50,000 Units total) by mouth once a week (Patient not taking: Reported on 5/7/2024) 12 capsule 3     No current facility-administered medications on file prior to visit.   No Known Allergies   Current Outpatient Medications on File Prior to Visit   Medication Sig Dispense Refill    [DISCONTINUED] benzonatate (TESSALON) 200 MG capsule Take 1 capsule (200 mg total) by mouth 3 (three) times a day as needed for cough (Patient not taking: Reported on 10/8/2024) 30 capsule 0    [DISCONTINUED] ergocalciferol (VITAMIN D2) 50,000 units Take 1 capsule (50,000 Units total) by mouth once a week (Patient not  "taking: Reported on 5/7/2024) 12 capsule 3     No current facility-administered medications on file prior to visit.      Social History     Tobacco Use    Smoking status: Never    Smokeless tobacco: Never   Vaping Use    Vaping status: Never Used   Substance and Sexual Activity    Alcohol use: Yes     Comment: occasionally    Drug use: Not Currently    Sexual activity: Not on file       Objective     /74   Pulse 74   Temp (!) 93.7 °F (34.3 °C)   Ht 5' 6\" (1.676 m)   Wt 72.4 kg (159 lb 9.6 oz)   SpO2 98%   BMI 25.76 kg/m²     Physical Exam  Vitals and nursing note reviewed.   Constitutional:       General: He is not in acute distress.     Appearance: He is normal weight. He is not ill-appearing, toxic-appearing or diaphoretic.   HENT:      Head: Normocephalic and atraumatic.      Nose: Nose normal.   Eyes:      General: No scleral icterus.        Right eye: No discharge.         Left eye: No discharge.      Extraocular Movements: Extraocular movements intact.      Conjunctiva/sclera: Conjunctivae normal.   Neck:      Vascular: No carotid bruit.   Cardiovascular:      Rate and Rhythm: Normal rate and regular rhythm.      Heart sounds: No murmur heard.  Pulmonary:      Effort: Pulmonary effort is normal. No respiratory distress.      Breath sounds: Normal breath sounds. No wheezing or rales.   Musculoskeletal:      Cervical back: Normal range of motion and neck supple.      Right lower leg: No edema.      Left lower leg: No edema.   Skin:     Findings: Lesion present. No rash.             Comments: 5mm raised erythematous nodule on the right cheek with rolled borders and telangiectasia.    Neurological:      Mental Status: He is alert. Mental status is at baseline.   Psychiatric:         Mood and Affect: Mood normal.         Behavior: Behavior normal.         Thought Content: Thought content normal.         Judgment: Judgment normal.       Biopsy    Date/Time: 10/8/2024 8:20 AM    Performed by: Osman" Dain Arrington PA-C  Authorized by: Osman Arrington PA-C  Universal Protocol:  procedure performed by consultantConsent: Verbal consent obtained.  Risks and benefits: risks, benefits and alternatives were discussed  Consent given by: patient  Patient understanding: patient states understanding of the procedure being performed  Patient consent: the patient's understanding of the procedure matches consent given  Procedure consent: procedure consent matches procedure scheduled  Relevant documents: relevant documents present and verified  Test results: test results available and properly labeled  Patient identity confirmed: verbally with patient    Procedure Details - Lesion Biopsy:     Body area:  Head/neck    Head/neck location:  R cheek    Biopsy method: shave biopsy      Biopsy tissue type: skin    Initial size (mm):  5    Final defect size (mm):  5     Discussed risks, benefits, alternatives with patient including bleeding, infection, scarring, hypopigmentation, regrowth   Patient tolerated procedure well.   Xylocaine with epi 1ml used for local anesthesia, skin prepped with alcohol swab.  Shave biopsy completed, sent to pathology  pressure with gauze used to stop minor bleeding  Patient instructed to cover with Vaseline or Aquaphor for 5 days to help improve healing

## 2024-10-09 ENCOUNTER — TELEPHONE (OUTPATIENT)
Age: 59
End: 2024-10-09

## 2024-10-09 NOTE — TELEPHONE ENCOUNTER
----- Message from Osman Arrington PA-C sent at 10/8/2024  9:35 PM EDT -----  Xray normal, No degenerative changes or soft tissue abnormalities.  Can continue with plan for ortho referral

## 2024-10-11 VITALS
OXYGEN SATURATION: 98 % | DIASTOLIC BLOOD PRESSURE: 76 MMHG | RESPIRATION RATE: 18 BRPM | HEART RATE: 78 BPM | HEIGHT: 66 IN | BODY MASS INDEX: 25.75 KG/M2 | SYSTOLIC BLOOD PRESSURE: 128 MMHG | WEIGHT: 160.2 LBS

## 2024-10-11 DIAGNOSIS — G89.29 CHRONIC RIGHT SHOULDER PAIN: ICD-10-CM

## 2024-10-11 DIAGNOSIS — M25.511 CHRONIC RIGHT SHOULDER PAIN: ICD-10-CM

## 2024-10-11 DIAGNOSIS — M75.81 ROTATOR CUFF TENDINITIS, RIGHT: Primary | ICD-10-CM

## 2024-10-11 PROCEDURE — 99203 OFFICE O/P NEW LOW 30 MIN: CPT | Performed by: STUDENT IN AN ORGANIZED HEALTH CARE EDUCATION/TRAINING PROGRAM

## 2024-10-11 NOTE — PROGRESS NOTES
ASSESSMENT/PLAN:    Diagnoses and all orders for this visit:    Rotator cuff tendinitis, right  -     Ambulatory Referral to Physical Therapy; Future    Chronic right shoulder pain  -     Ambulatory Referral to Orthopedic Surgery        Discussed history, exam, and imaging with patient. Presentation most consistent with rotator cuff tendinitis and we will plan for non-operative management at this time.  Discussed oral/topical medication regimen. Discussed use of oral tylenol and NSAIDs as well as topical voltaren gel.  Discussed injections as a pain adjunct. Patient may consider injections if pain worsens.  Discussed rehabilitation efforts. Will plan for physical therapy to improve range of motion and strengthening.  Discussed advanced imaging in the form of MRI. Discussed that MRI will be considered if he continues to lose ROM and increase pain.  _____________________________________________________  CHIEF COMPLAINT:  Chief Complaint   Patient presents with    Right Shoulder - Clicking, Pain     Limited range of motion. Pain increases to 6-8       SUBJECTIVE:  Fer Snider is a 59 y.o. year old male, right HD, who presents for evaluation of right shoulder pain. The issue began a few years ago. 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 He points near the greater tuberosity and bicipital groove for where the pain is the worse, also noting that it radiates down to the elbow some. He denies attempting oral pain medications, injections into the shoulder, or physical therapy in the past. He has a history of cervical radiculopathy with C6-C7 epidural steroid injection under fluoroscopy with Dr Green in May 2022. Denies back pain today.          PAST MEDICAL HISTORY:  Past Medical History:   Diagnosis Date    Depression with anxiety 1/30/2015    Fatigue 1/30/2015       PAST SURGICAL  "HISTORY:  History reviewed. No pertinent surgical history.    FAMILY HISTORY:  Family History   Problem Relation Age of Onset    No Known Problems Mother     Diabetes Father     Heart disease Father     Diabetes Sister     Heart disease Sister     Diabetes Brother     Heart disease Brother        SOCIAL HISTORY:  Social History     Tobacco Use    Smoking status: Never    Smokeless tobacco: Never   Vaping Use    Vaping status: Never Used   Substance Use Topics    Alcohol use: Yes     Comment: occasionally    Drug use: Not Currently       MEDICATIONS:  No current outpatient medications on file.    ALLERGIES:  No Known Allergies    Review of systems:   Constitutional: Negative for fatigue, fever or loss of apetite.   HENT: Negative.    Respiratory: Negative for shortness of breath, dyspnea.    Cardiovascular: Negative for chest pain/tightness.   Gastrointestinal: Negative for abdominal pain, N/V.   Endocrine: Negative for cold/heat intolerance, unexplained weight loss/gain.   Genitourinary: Negative for flank pain, dysuria, hematuria.   Musculoskeletal: As in HPI  Skin: Negative for rash.    Neurological: Positives as noted in HPI  Psychiatric/Behavioral: Negative for agitation.  _____________________________________________________  PHYSICAL EXAMINATION:    Blood pressure 128/76, pulse 78, resp. rate 18, height 5' 6\" (1.676 m), weight 72.7 kg (160 lb 3.2 oz), SpO2 98%.    General: well developed, well nourished, alert and oriented times 3, no acute distress  HEENT: Benign, normocephalic, atraumatic  Cardiovascular: regular rate    Pulmonary: No wheezing or stridor  Abdomen: Soft, Nontender  Skin: No open wounds, erythema, rash  Neurovascular: per examination below    MUSCULOSKELETAL EXAMINATION:    Right Shoulder exam  No bruising, swelling or deformity  NonTTP cervical spine, clavicle, AC joint, acromion, scapular spine, posterior joint line, deltoid, anterior joint line, bicipital groove  Active ROM  Forward " flexion: 150  External rotation in adduction: 30 (80 left)  Internal rotation: lumbar spine  Passive ROM  Forward flexion: 160  Provocative maneuvers:   +: cross body adduction, choudhury  -: belly press, speeds, yergasons  SILT C5-T1  2+ Radial pulse, symmetric with contralateral      _____________________________________________________  STUDIES REVIEWED:  I have personally reviewed pertinent films in PACS and my interpretation is: xrays of the right shoulder taken on 10/8 demonstrate no acute fracture or dislocation, and no degenerative changes of the glenohumeral or acromioclavicular joints    Scribe Attestation      I,:  Yonatan Cavazos PA-C am acting as a scribe while in the presence of the attending physician.:       I,:  Chaparro Mckenzie MD personally performed the services described in this documentation    as scribed in my presence.:

## 2024-10-15 ENCOUNTER — TELEPHONE (OUTPATIENT)
Age: 59
End: 2024-10-15

## 2024-10-15 PROCEDURE — 88305 TISSUE EXAM BY PATHOLOGIST: CPT | Performed by: PATHOLOGY

## 2024-10-15 NOTE — TELEPHONE ENCOUNTER
----- Message from Osman Arrington PA-C sent at 10/15/2024 11:30 AM EDT -----  Please let patient know biopsy came back benign- - Sebaceous hyperplasia.  - Negative for malignancy.

## 2024-10-23 ENCOUNTER — EVALUATION (OUTPATIENT)
Dept: PHYSICAL THERAPY | Facility: CLINIC | Age: 59
End: 2024-10-23
Payer: COMMERCIAL

## 2024-10-23 DIAGNOSIS — M75.81 ROTATOR CUFF TENDINITIS, RIGHT: Primary | ICD-10-CM

## 2024-10-23 PROCEDURE — 97161 PT EVAL LOW COMPLEX 20 MIN: CPT | Performed by: PHYSICAL THERAPIST

## 2024-10-23 PROCEDURE — 97110 THERAPEUTIC EXERCISES: CPT | Performed by: PHYSICAL THERAPIST

## 2024-10-23 NOTE — PROGRESS NOTES
PT Evaluation     Today's date: 10/23/2024  Patient name: Fer Snider  : 1965  MRN: [id number]  Referring provider: Yonatan Cavazos PA-C  Dx:   Encounter Diagnosis     ICD-10-CM    1. Rotator cuff tendinitis, right  M75.81 Ambulatory Referral to Physical Therapy                     Assessment  Impairments: abnormal or restricted ROM, activity intolerance, impaired physical strength, lacks appropriate home exercise program, pain with function and poor posture   Symptom irritability: low    Assessment details: Patient was provided a home exercise program and demonstrated an understanding of exercises.  Patient was advised to stop performing home exercise program if symptoms increase or new complaints developed.  Verbal understanding demonstrated regarding home exercise program instructions.  Patient would benefit from skilled physical therapy services for prescribed exercises, manual interventions, neuromuscular re-education, education, and modalities as deemed appropriate to assist patient in achieving their maximum level of function.    Patient presents with long standing, progressive right shoulder pain - NKI  He wishes to learn HEP rather than to attend formal PT at this time    After discussion - he agrees to return for 1 additional session to progress his HEP in 2 weeks after performing independently     Evaluation reveals:  decreased postural awareness and self correction, loss of end range AROM right > left shoulder, weakness right UE - little tenderness with palpation elicited, Right UT trigger points/ tightness.     He presents motivated to improve his functional abilities/ work tolerance and to reduce his pain.     Understanding of Dx/Px/POC: good    Goals  STG   1.  Patient will demonstrate independence and competence with HEP 2 -4 weeks  2.  Patient will report > 25-50% reduced pain 2-4 weeks    LTG   1.  Patient will report improvements with both functional and recreational abilities  4-6  weeks  2.  Patient will demonstrate improved motor function  4-6 weeks.   3.  Improved postural awareness and self correction  3-6 weeks.   4.  Restoration of full/ painfree right shoulder arom all planes  3-8 weeks.     Plan  Patient would benefit from: skilled physical therapy  Planned modality interventions: cryotherapy and thermotherapy: hydrocollator packs    Planned therapy interventions: IADL retraining, joint mobilization, manual therapy, patient education, postural training, strengthening, stretching, therapeutic exercise, flexibility, home exercise program and neuromuscular re-education    Frequency: Every other week  Duration in weeks: 4  Treatment plan discussed with: patient  Plan details: Patient response to treatment will be monitored each session and progressed accordingly    Thank you for this referral.     Subjective Evaluation    History of Present Illness  Mechanism of injury: Right shoulder pain x 2-3 years - NKI   it has progressively gotten worse  He works as  a busby  He has found over the last 8+ months that his symptoms have progressed with pain, weakness, loss of ROM    PMH : cervical pain , pinched nerve with R UE symptoms - had PT   he feels todays pain is different and not related to neck.     (-) nighttime pain       Patient Goals  Patient goals for therapy: decreased pain and independence with ADLs/IADLs  Patient goal: learn some stretching/ techniques to improve  Pain  Current pain rating: 3  Quality: nagging.  Relieving factors: change in position  Progression: worsening    Social Support  Lives with: alone    Employment status: working  Hand dominance: right        Objective     Postural Observations  Seated posture: fair  Standing posture: fair  Correction of posture: makes symptoms better    Additional Postural Observation Details  Shoulder protracted, mild thoracic kyphosis/ mild scapular winging R > L  - ue's ir, reduced lordosis    He can improve his posture but appears to  "lack endurance to maintain      Palpation     Right   Trigger point to upper trapezius.     Tenderness     Right Shoulder  No tenderness in the AC joint, acromion, biceps tendon (proximal), coracoid process, lateral scapula, medial scapula and supraspinatus tendon.     Cervical/Thoracic Screen   Cervical range of motion within normal limits with the following exceptions: Mild end range movement loss all planes   Mild right UT \"tightness\" reported with lateral flexion right     (-) compression,  (-) spurlings     Neurological Testing     Sensation     Shoulder   Left Shoulder   Intact: light touch    Right Shoulder   Intact: Light touch    Active Range of Motion   Left Shoulder   Flexion: WFL  Abduction: WFL  External rotation BTH: WFL  Internal rotation BTB: WFL    Right Shoulder   Flexion: 150 degrees   Abduction: 160 degrees   External rotation BTH: C2   Internal rotation BTB: L2     Passive Range of Motion     Right Shoulder   Flexion: 160 degrees with pain  Abduction: 170 degrees with pain  External rotation 90°: WFL and with pain  Internal rotation 90°: 50 degrees with pain    Strength/Myotome Testing     Left Shoulder   Normal muscle strength    Right Shoulder     Planes of Motion   Flexion: 4+   Extension: 5   Abduction: 4-   Adduction: 5   External rotation at 0°: 4   Internal rotation at 0°: 5     Isolated Muscles   Biceps: 5   Triceps: 4+     Tests     Right Shoulder   Positive crossover and Hawkin's.   Negative clunk, crank, drop arm, empty can, lift-off and painful arc.            Precautions: Right shoulder pain, tendonitis    [email or web address]  Access Code: [email or web address]      POC expires Unit limit Auth Expiration date PT/OT/ST + Visit Limit?   12.23. 24 4 na bomn                           Visit/Unit Tracking  AUTH Status:  Date 10.23              na Used 1               Remaining                      Manuals 10.23                         PROM right shoulder  db                              "         Neuro Re-Ed                          Back rolls/ scap retract 20x ea                          Pect stretch  20s x 2  P!            IR Towel stretch No stretch                                      Ther Ex             UBE             pulleys             Wall slides flexion/ scap 10s x 5 ea                          TB- lpd, no monies, rows Grn x20 ea             TB wall walks             Prone 45, 90 x 2             Serratus/ triceps             BOR/ tricep kickbacks                                                                                                        Ther Activity                                       Gait Training                                       Modalities

## 2024-11-07 ENCOUNTER — OFFICE VISIT (OUTPATIENT)
Dept: PHYSICAL THERAPY | Facility: CLINIC | Age: 59
End: 2024-11-07
Payer: COMMERCIAL

## 2024-11-07 DIAGNOSIS — M75.81 ROTATOR CUFF TENDINITIS, RIGHT: Primary | ICD-10-CM

## 2024-11-07 PROCEDURE — 97110 THERAPEUTIC EXERCISES: CPT | Performed by: PHYSICAL THERAPIST

## 2024-11-07 PROCEDURE — 97112 NEUROMUSCULAR REEDUCATION: CPT | Performed by: PHYSICAL THERAPIST

## 2024-11-07 NOTE — PROGRESS NOTES
"  DISCHARGE SUMMARY/  Daily Note     Today's date: 2024  Patient name: Fer Snider  : 1965  MRN: [id number]  Referring provider: Yonatan Cavazos PA-C  Dx:   Encounter Diagnosis     ICD-10-CM    1. Rotator cuff tendinitis, right  M75.81                      Subjective: Patient reports that the first week of HEP \"was brutal\"  he was very sore  -  but, week 2 is making him realize that he is improving, has less pain and is paying much more attention to his posturing and self correcting now thru-out his day   Pleased with his progress   Reports wanting to manage HEP at this time     Objective: See treatment diary below  AAROM right shoulder - Full all planes - tightness greatest with ER end range.    Assessment: Tolerated treatment well. Patient demonstrated fatigue post treatment and exhibited good technique with therapeutic exercises    Goals  STG   1.  Patient will demonstrate independence and competence with HEP 2 -4 weeks  MET  2.  Patient will report > 25-50% reduced pain 2-4 weeks   MET    LTG   1.  Patient will report improvements with both functional and recreational abilities  4-6 weeks  ONGOING  2.  Patient will demonstrate improved motor function  4-6 weeks. ONGOING  3.  Improved postural awareness and self correction  3-6 weeks.   ONGOING  4.  Restoration of full/ painfree right shoulder arom all planes  3-8 weeks.   Partially MET        Plan:  Patient is demonstrating competency with HEP , issued written HEP and GTB/BTB      recommendation is to continue with postural correction/ exercises progressing as able   contact our department if he needs us in the future or pain/ symptoms change.   Discharge skilled PT at this time     Good progress with HEP to date    FOTO INCREASED        Precautions: Right shoulder pain, tendonitis    [email or web address]  Access Code: [email or web address]      POC expires Unit limit Auth Expiration date PT/OT/ST + Visit Limit?   . 24 4 na bomn             "               Visit/Unit Tracking  AUTH Status:  Date 10.23 11.7             na Used 1 2              Remaining                      Manuals 10.23 11.7                        PROM right shoulder  db db                                     Neuro Re-Ed                          Back rolls/ scap retract 20x ea  20x ea                        Pect stretch  20s x 2  P! 20s x 3            IR Towel stretch No stretch                                      Ther Ex             UBE  5' alt            pulleys  3 min            Wall slides flexion/ scap 10s x 5 ea  10s x 5 ea                        TB- lpd, no monies, rows Grn x20 ea  Grn x 20 ea           TB wall walks  Grn 5-10s x 10           Prone 45, 90 x 2  20x ea            Serratus/ triceps  2# x 20            BOR/ tricep kickbacks/ bicep curls  2# x 20 ea           Stand flex/ scap to 90  2# x 20 ea                                                                                          Ther Activity                                       Gait Training                                       Modalities